# Patient Record
Sex: MALE | Race: WHITE | NOT HISPANIC OR LATINO | URBAN - METROPOLITAN AREA
[De-identification: names, ages, dates, MRNs, and addresses within clinical notes are randomized per-mention and may not be internally consistent; named-entity substitution may affect disease eponyms.]

---

## 2017-04-11 ENCOUNTER — OFFICE VISIT - RIVER FALLS (OUTPATIENT)
Dept: FAMILY MEDICINE | Facility: CLINIC | Age: 57
End: 2017-04-11

## 2017-05-02 ENCOUNTER — OFFICE VISIT - RIVER FALLS (OUTPATIENT)
Dept: FAMILY MEDICINE | Facility: CLINIC | Age: 57
End: 2017-05-02

## 2017-05-02 ASSESSMENT — MIFFLIN-ST. JEOR: SCORE: 2227.4

## 2017-08-18 ENCOUNTER — AMBULATORY - RIVER FALLS (OUTPATIENT)
Dept: FAMILY MEDICINE | Facility: CLINIC | Age: 57
End: 2017-08-18

## 2017-08-19 LAB — HBA1C MFR BLD: 9.4 %

## 2017-08-23 ENCOUNTER — OFFICE VISIT - RIVER FALLS (OUTPATIENT)
Dept: FAMILY MEDICINE | Facility: CLINIC | Age: 57
End: 2017-08-23

## 2017-12-11 ENCOUNTER — AMBULATORY - RIVER FALLS (OUTPATIENT)
Dept: FAMILY MEDICINE | Facility: CLINIC | Age: 57
End: 2017-12-11

## 2017-12-12 LAB
HBA1C MFR BLD: 10.2 %
PSA SERPL-MCNC: 0.2 NG/ML

## 2018-04-23 ENCOUNTER — AMBULATORY - RIVER FALLS (OUTPATIENT)
Dept: FAMILY MEDICINE | Facility: CLINIC | Age: 58
End: 2018-04-23

## 2018-04-24 LAB
CHOLEST SERPL-MCNC: 134 MG/DL
CHOLEST/HDLC SERPL: 3.6 {RATIO}
CREAT SERPL-MCNC: 0.81 MG/DL (ref 0.7–1.33)
GLUCOSE BLD-MCNC: 206 MG/DL (ref 65–99)
HBA1C MFR BLD: 9 %
HDLC SERPL-MCNC: 37 MG/DL
LDLC SERPL CALC-MCNC: 71 MG/DL
NONHDLC SERPL-MCNC: 97 MG/DL
TRIGL SERPL-MCNC: 180 MG/DL

## 2018-06-27 ENCOUNTER — OFFICE VISIT - RIVER FALLS (OUTPATIENT)
Dept: FAMILY MEDICINE | Facility: CLINIC | Age: 58
End: 2018-06-27

## 2018-06-27 ASSESSMENT — MIFFLIN-ST. JEOR: SCORE: 2219.23

## 2018-07-22 LAB — HBA1C MFR BLD: 9.5 %

## 2019-01-04 ENCOUNTER — AMBULATORY - RIVER FALLS (OUTPATIENT)
Dept: FAMILY MEDICINE | Facility: CLINIC | Age: 59
End: 2019-01-04

## 2019-01-05 LAB — HBA1C MFR BLD: 10.1 %

## 2019-01-11 ENCOUNTER — OFFICE VISIT - RIVER FALLS (OUTPATIENT)
Dept: FAMILY MEDICINE | Facility: CLINIC | Age: 59
End: 2019-01-11

## 2019-01-11 ASSESSMENT — MIFFLIN-ST. JEOR: SCORE: 2217.42

## 2019-07-31 ENCOUNTER — COMMUNICATION - RIVER FALLS (OUTPATIENT)
Dept: FAMILY MEDICINE | Facility: CLINIC | Age: 59
End: 2019-07-31

## 2019-10-01 ENCOUNTER — AMBULATORY - RIVER FALLS (OUTPATIENT)
Dept: FAMILY MEDICINE | Facility: CLINIC | Age: 59
End: 2019-10-01

## 2019-10-02 ENCOUNTER — COMMUNICATION - RIVER FALLS (OUTPATIENT)
Dept: FAMILY MEDICINE | Facility: CLINIC | Age: 59
End: 2019-10-02

## 2019-10-02 LAB
BUN SERPL-MCNC: 19 MG/DL (ref 7–25)
BUN/CREAT RATIO - HISTORICAL: ABNORMAL (ref 6–22)
CALCIUM SERPL-MCNC: 9.6 MG/DL (ref 8.6–10.3)
CHLORIDE BLD-SCNC: 99 MMOL/L (ref 98–110)
CHOLEST SERPL-MCNC: 136 MG/DL
CHOLEST/HDLC SERPL: 3.8 {RATIO}
CO2 SERPL-SCNC: 25 MMOL/L (ref 20–32)
CREAT SERPL-MCNC: 0.88 MG/DL (ref 0.7–1.33)
CREAT UR-MCNC: 137 MG/DL (ref 20–320)
EGFRCR SERPLBLD CKD-EPI 2021: 95 ML/MIN/1.73M2
GLUCOSE BLD-MCNC: 283 MG/DL (ref 65–99)
HBA1C MFR BLD: 12 %
HDLC SERPL-MCNC: 36 MG/DL
LDLC SERPL CALC-MCNC: 69 MG/DL
MICROALBUMIN UR-MCNC: 0.7 MG/DL
MICROALBUMIN/CREAT UR: 5 MG/G{CREAT}
NONHDLC SERPL-MCNC: 100 MG/DL
POTASSIUM BLD-SCNC: 4.2 MMOL/L (ref 3.5–5.3)
SODIUM SERPL-SCNC: 133 MMOL/L (ref 135–146)
TRIGL SERPL-MCNC: 223 MG/DL

## 2019-10-08 ENCOUNTER — COMMUNICATION - RIVER FALLS (OUTPATIENT)
Dept: FAMILY MEDICINE | Facility: CLINIC | Age: 59
End: 2019-10-08

## 2019-10-18 ENCOUNTER — OFFICE VISIT - RIVER FALLS (OUTPATIENT)
Dept: FAMILY MEDICINE | Facility: CLINIC | Age: 59
End: 2019-10-18

## 2019-10-18 ASSESSMENT — MIFFLIN-ST. JEOR: SCORE: 2167.52

## 2020-02-24 ENCOUNTER — AMBULATORY - RIVER FALLS (OUTPATIENT)
Dept: FAMILY MEDICINE | Facility: CLINIC | Age: 60
End: 2020-02-24

## 2020-02-25 LAB — HBA1C MFR BLD: 12 %

## 2020-02-26 ENCOUNTER — COMMUNICATION - RIVER FALLS (OUTPATIENT)
Dept: FAMILY MEDICINE | Facility: CLINIC | Age: 60
End: 2020-02-26

## 2020-03-16 ENCOUNTER — COMMUNICATION - RIVER FALLS (OUTPATIENT)
Dept: FAMILY MEDICINE | Facility: CLINIC | Age: 60
End: 2020-03-16

## 2020-06-16 ENCOUNTER — OFFICE VISIT - RIVER FALLS (OUTPATIENT)
Dept: FAMILY MEDICINE | Facility: CLINIC | Age: 60
End: 2020-06-16

## 2020-06-16 ASSESSMENT — MIFFLIN-ST. JEOR: SCORE: 2129.42

## 2020-09-01 ENCOUNTER — COMMUNICATION - RIVER FALLS (OUTPATIENT)
Dept: FAMILY MEDICINE | Facility: CLINIC | Age: 60
End: 2020-09-01

## 2020-10-12 ENCOUNTER — AMBULATORY - RIVER FALLS (OUTPATIENT)
Dept: FAMILY MEDICINE | Facility: CLINIC | Age: 60
End: 2020-10-12

## 2020-10-13 ENCOUNTER — COMMUNICATION - RIVER FALLS (OUTPATIENT)
Dept: FAMILY MEDICINE | Facility: CLINIC | Age: 60
End: 2020-10-13

## 2020-10-13 LAB
BUN SERPL-MCNC: 17 MG/DL (ref 7–25)
BUN/CREAT RATIO - HISTORICAL: ABNORMAL (ref 6–22)
CALCIUM SERPL-MCNC: 9.3 MG/DL (ref 8.6–10.3)
CHLORIDE BLD-SCNC: 103 MMOL/L (ref 98–110)
CHOLEST SERPL-MCNC: 149 MG/DL
CHOLEST/HDLC SERPL: 3.5 {RATIO}
CO2 SERPL-SCNC: 28 MMOL/L (ref 20–32)
CREAT SERPL-MCNC: 0.85 MG/DL (ref 0.7–1.33)
CREAT UR-MCNC: 91 MG/DL (ref 20–320)
EGFRCR SERPLBLD CKD-EPI 2021: 96 ML/MIN/1.73M2
GLUCOSE BLD-MCNC: 209 MG/DL (ref 65–99)
HBA1C MFR BLD: 8.4 %
HDLC SERPL-MCNC: 43 MG/DL
LDLC SERPL CALC-MCNC: 81 MG/DL
MICROALBUMIN UR-MCNC: 0.3 MG/DL
MICROALBUMIN/CREAT UR: 3 MG/G{CREAT}
NONHDLC SERPL-MCNC: 106 MG/DL
POTASSIUM BLD-SCNC: 4.4 MMOL/L (ref 3.5–5.3)
SODIUM SERPL-SCNC: 138 MMOL/L (ref 135–146)
TRIGL SERPL-MCNC: 145 MG/DL

## 2020-10-23 ENCOUNTER — COMMUNICATION - RIVER FALLS (OUTPATIENT)
Dept: FAMILY MEDICINE | Facility: CLINIC | Age: 60
End: 2020-10-23

## 2020-12-23 ENCOUNTER — OFFICE VISIT - RIVER FALLS (OUTPATIENT)
Dept: FAMILY MEDICINE | Facility: CLINIC | Age: 60
End: 2020-12-23

## 2020-12-23 ASSESSMENT — MIFFLIN-ST. JEOR: SCORE: 2217.87

## 2021-02-01 ENCOUNTER — COMMUNICATION - RIVER FALLS (OUTPATIENT)
Dept: FAMILY MEDICINE | Facility: CLINIC | Age: 61
End: 2021-02-01

## 2021-02-05 LAB — HBA1C MFR BLD: 7.1 %

## 2021-06-01 ENCOUNTER — AMBULATORY - RIVER FALLS (OUTPATIENT)
Dept: FAMILY MEDICINE | Facility: CLINIC | Age: 61
End: 2021-06-01

## 2021-06-02 LAB — HBA1C MFR BLD: 7.4 %

## 2021-06-06 ENCOUNTER — COMMUNICATION - RIVER FALLS (OUTPATIENT)
Dept: FAMILY MEDICINE | Facility: CLINIC | Age: 61
End: 2021-06-06

## 2021-06-30 ENCOUNTER — OFFICE VISIT - RIVER FALLS (OUTPATIENT)
Dept: FAMILY MEDICINE | Facility: CLINIC | Age: 61
End: 2021-06-30

## 2021-06-30 ASSESSMENT — MIFFLIN-ST. JEOR: SCORE: 2262.32

## 2021-07-01 ENCOUNTER — COMMUNICATION - RIVER FALLS (OUTPATIENT)
Dept: FAMILY MEDICINE | Facility: CLINIC | Age: 61
End: 2021-07-01

## 2021-07-01 LAB — PSA SERPL-MCNC: 0.2 NG/ML

## 2022-02-11 VITALS — HEIGHT: 69 IN | BODY MASS INDEX: 46.37 KG/M2 | SYSTOLIC BLOOD PRESSURE: 124 MMHG | DIASTOLIC BLOOD PRESSURE: 70 MMHG

## 2022-02-12 VITALS
HEIGHT: 69 IN | HEART RATE: 65 BPM | SYSTOLIC BLOOD PRESSURE: 130 MMHG | DIASTOLIC BLOOD PRESSURE: 66 MMHG | BODY MASS INDEX: 43.4 KG/M2 | OXYGEN SATURATION: 95 % | TEMPERATURE: 97.2 F | WEIGHT: 293 LBS

## 2022-02-12 VITALS
SYSTOLIC BLOOD PRESSURE: 134 MMHG | SYSTOLIC BLOOD PRESSURE: 132 MMHG | BODY MASS INDEX: 46.81 KG/M2 | WEIGHT: 315 LBS | DIASTOLIC BLOOD PRESSURE: 72 MMHG | DIASTOLIC BLOOD PRESSURE: 76 MMHG | HEART RATE: 64 BPM | TEMPERATURE: 98.1 F

## 2022-02-12 VITALS
HEART RATE: 66 BPM | WEIGHT: 314.6 LBS | DIASTOLIC BLOOD PRESSURE: 80 MMHG | HEIGHT: 69 IN | SYSTOLIC BLOOD PRESSURE: 124 MMHG | TEMPERATURE: 97.9 F | DIASTOLIC BLOOD PRESSURE: 76 MMHG | BODY MASS INDEX: 46.6 KG/M2 | SYSTOLIC BLOOD PRESSURE: 128 MMHG

## 2022-02-12 VITALS
HEIGHT: 69 IN | BODY MASS INDEX: 46.33 KG/M2 | SYSTOLIC BLOOD PRESSURE: 124 MMHG | WEIGHT: 312.8 LBS | OXYGEN SATURATION: 98 % | HEART RATE: 58 BPM | DIASTOLIC BLOOD PRESSURE: 68 MMHG

## 2022-02-12 VITALS
WEIGHT: 312.5 LBS | HEIGHT: 69 IN | TEMPERATURE: 97.7 F | HEART RATE: 71 BPM | OXYGEN SATURATION: 97 % | BODY MASS INDEX: 46.29 KG/M2 | DIASTOLIC BLOOD PRESSURE: 72 MMHG | SYSTOLIC BLOOD PRESSURE: 130 MMHG

## 2022-02-12 VITALS
SYSTOLIC BLOOD PRESSURE: 138 MMHG | HEART RATE: 60 BPM | WEIGHT: 312.4 LBS | DIASTOLIC BLOOD PRESSURE: 82 MMHG | BODY MASS INDEX: 46.27 KG/M2 | HEIGHT: 69 IN | SYSTOLIC BLOOD PRESSURE: 122 MMHG | TEMPERATURE: 97.8 F | DIASTOLIC BLOOD PRESSURE: 70 MMHG

## 2022-02-12 VITALS
DIASTOLIC BLOOD PRESSURE: 70 MMHG | SYSTOLIC BLOOD PRESSURE: 124 MMHG | WEIGHT: 301.4 LBS | HEIGHT: 69 IN | SYSTOLIC BLOOD PRESSURE: 128 MMHG | BODY MASS INDEX: 44.64 KG/M2 | HEART RATE: 72 BPM | DIASTOLIC BLOOD PRESSURE: 70 MMHG | TEMPERATURE: 97.8 F

## 2022-02-12 VITALS
HEART RATE: 81 BPM | DIASTOLIC BLOOD PRESSURE: 74 MMHG | TEMPERATURE: 97.4 F | BODY MASS INDEX: 46.65 KG/M2 | SYSTOLIC BLOOD PRESSURE: 128 MMHG | WEIGHT: 315 LBS | OXYGEN SATURATION: 96 % | HEIGHT: 69 IN

## 2022-02-12 VITALS — SYSTOLIC BLOOD PRESSURE: 124 MMHG | HEIGHT: 69 IN | DIASTOLIC BLOOD PRESSURE: 76 MMHG | BODY MASS INDEX: 43.27 KG/M2

## 2022-02-12 VITALS — DIASTOLIC BLOOD PRESSURE: 70 MMHG | HEART RATE: 60 BPM | SYSTOLIC BLOOD PRESSURE: 118 MMHG

## 2022-02-12 VITALS — SYSTOLIC BLOOD PRESSURE: 128 MMHG | DIASTOLIC BLOOD PRESSURE: 70 MMHG | HEART RATE: 70 BPM

## 2022-02-16 NOTE — TELEPHONE ENCOUNTER
"---------------------  From: Brandy Hatfield LPN (Phone Messages Pool ([id number]_[facility]))   To: [facility] Message Pool ([id number]);     Sent: 10/27/2020 9:22:48 AM CDT  Subject: blood sugars/glipizide     Phone Message    PCP:   ABHIJEET      Time of Call:  8:53am       Person Calling:  mercedes Steve  Phone number:  [phone] or [phone]    Returned call at: 9:10am    Note:   Pt LM stating his A1c seems to be climbing a little bit and wants to get ahead of it again. Pt asking for [facility] to call him to discuss.    Per result letter 10/13 A1c was better- [facility] wanted to see him for follow up.    Returned call and informed pt of result letter. Pt says he knows that but his blood sugars are back over 200. Pt says blood sugar Sunday morning was 252, Monday morning 213 and this morning was 232.    Pt says he has been out of glipizide for about 1.5 weeks because \"they\" wouldn't refill them (not on med list). Pt says he thinks he needs them because when he was taking the glipizide his blood sugars were around 146. Pt says the time he did his labs he was still taking the glipizide.    Told pt he should schedule follow up with [facility]. Pt says he has some eye problems and wants to see the eye doctor first but can't until 11/16. Pt also says he needs to have a colonoscopy.    Pt asked that message be sent to [facility] to see about starting glipizide again.    Last office visit and reason:  _Glipizide 10mg 1 tab po bid #180+0, prescribed 4/22/20. This med was completed off med list by Alexandra CAO---------------------  From: Ingrid Calvin LPN ([facility] Message Pool ([id number]))   To: Clare Garcia;     Sent: 10/27/2020 10:09:52 AM CDT  Subject: FW: blood sugars/glipizide---------------------  From: Clare Garcia   To: ABHIJEET Message Pool ([id number]_[facility]);     Sent: 10/27/2020 10:23:56 AM CDT  Subject: RE: blood sugars/glipizide     I sent a message to Alexandra Moser RD to give him a call. SHe and I do not want him back on " the glipizide due to hypoglycemia risk now that he is on insulin, but she might want to adjust his insulinNoted.

## 2022-02-16 NOTE — NURSING NOTE
Comprehensive Intake Entered On:  6/30/2021 8:55 AM CDT    Performed On:  6/30/2021 8:50 AM CDT by Ingrid Calvin LPN               Summary   Chief Complaint :   DM visit, all labs UTD   Weight Measured :   322.3 lb(Converted to: 322 lb 5 oz, 146.193 kg)    Height Measured :   69 in(Converted to: 5 ft 9 in, 175.26 cm)    Body Mass Index :   47.59 kg/m2 (HI)    Body Surface Area :   2.67 m2   Systolic Blood Pressure :   142 mmHg (HI)    Diastolic Blood Pressure :   68 mmHg   Mean Arterial Pressure :   93 mmHg   Peripheral Pulse Rate :   81 bpm   BP Site :   Right arm   BP Method :   Manual   Temperature Tympanic :   97.4 DegF(Converted to: 36.3 DegC)  (LOW)    Oxygen Saturation :   96 %   Ingrid Calvin LPN - 6/30/2021 8:50 AM CDT   Health Status   Allergies Verified? :   Yes   Medication History Verified? :   Yes   Medical History Verified? :   No   Pre-Visit Planning Status :   Completed   Tobacco Use? :   Never smoker   Ingrid Calvin LPN - 6/30/2021 8:50 AM CDT   Meds / Allergies   (As Of: 6/30/2021 8:55:28 AM CDT)   Allergies (Active)   Actos  Estimated Onset Date:   Unspecified ; Reactions:   rash ; Created By:   Clare Garcia; Reaction Status:   Active ; Category:   Drug ; Substance:   Actos ; Type:   Allergy ; Updated By:   Clare Garcia; Reviewed Date:   6/17/2020 4:00 PM CDT      Farxiga  Estimated Onset Date:   Unspecified ; Reactions:   rash ; Created By:   Myra Sigala; Reaction Status:   Active ; Category:   Drug ; Substance:   Farxiga ; Type:   Allergy ; Updated By:   Myra Sigala; Reviewed Date:   6/17/2020 4:00 PM CDT      metFORMIN  Estimated Onset Date:   Unspecified ; Reactions:   diarrhea ; Created By:   Vannesa Mcgowan MA; Reaction Status:   Active ; Category:   Drug ; Substance:   metFORMIN ; Type:   Intolerance ; Updated By:   Vannesa Mcgowan MA; Source:   Patient ; Reviewed Date:   6/17/2020 4:00 PM CDT        Medication List   (As Of: 6/30/2021 8:55:28 AM CDT)    Prescription/Discharge Order    Miscellaneous Rx Supply  :   Miscellaneous Rx Supply ; Status:   Prescribed ; Ordered As Mnemonic:   True Test BGM Strips MG BEAD ; Simple Display Line:   See Instructions, Test 3-4 times daily, 1 box(es) ; Ordering Provider:   Adrián Bass MD; Catalog Code:   Miscellaneous Rx Supply ; Order Dt/Tm:   4/15/2015 9:05:01 AM CDT          Miscellaneous Prescription  :   Miscellaneous Prescription ; Status:   Prescribed ; Ordered As Mnemonic:   TRUETEST BGM STRIPS MG BEAD ; Simple Display Line:   See Instructions, TEST THREE TIMES A DAY TO FOUR TIMES A DAY, 50 unknown unit, 5 Refill(s) ; Ordering Provider:   Adrián Bass MD; Catalog Code:   Miscellaneous Prescription ; Order Dt/Tm:   2/1/2018 2:22:55 PM CST          glipiZIDE  :   glipiZIDE ; Status:   Prescribed ; Ordered As Mnemonic:   glipiZIDE 10 mg oral tablet ; Simple Display Line:   See Instructions, TAKE ONE TABLET BY MOUTH TWICE DAILY, 180 tab(s), 2 Refill(s) ; Ordering Provider:   Clare Garcia; Catalog Code:   glipiZIDE ; Order Dt/Tm:   11/2/2020 5:11:12 PM CST          simvastatin  :   simvastatin ; Status:   Prescribed ; Ordered As Mnemonic:   simvastatin 40 mg oral tablet ; Simple Display Line:   See Instructions, TAKE ONE TABLET BY MOUTH AT BEDTIME, 90 EA, 3 Refill(s) ; Ordering Provider:   Clare Garcia; Catalog Code:   simvastatin ; Order Dt/Tm:   12/23/2020 9:54:33 AM CST          insulin aspart  :   insulin aspart ; Status:   Prescribed ; Ordered As Mnemonic:   NovoLOG FlexPen 100 units/mL injectable solution ; Simple Display Line:   See Instructions, take insulin 15 minutes before meals   5u am, 7u noon, 7u evening meal increasing as directed, 15 mL, 3 Refill(s) ; Ordering Provider:   Clare Garcia; Catalog Code:   insulin aspart ; Order Dt/Tm:   6/16/2020 11:22:14 AM CDT          insulin glargine  :   insulin glargine ; Status:   Prescribed ; Ordered As Mnemonic:   Lantus Solostar Pen 100  units/mL subcutaneous solution ; Simple Display Line:   See Instructions, starting at 20u and increasing up to 40u as needed max dose   every evening, 15 mL, 3 Refill(s) ; Ordering Provider:   Clare Garcia; Catalog Code:   insulin glargine ; Order Dt/Tm:   6/16/2020 11:11:19 AM CDT          Miscellaneous Prescription  :   Miscellaneous Prescription ; Status:   Prescribed ; Ordered As Mnemonic:   pen needles 31G 6mm ; Simple Display Line:   See Instructions, use new needle daily with Soliqua, 1 box(es), 1 Refill(s) ; Ordering Provider:   Clare Garcia; Catalog Code:   Miscellaneous Prescription ; Order Dt/Tm:   12/17/2019 2:18:08 PM CST          albuterol  :   albuterol ; Status:   Prescribed ; Ordered As Mnemonic:   Ventolin HFA 90 mcg/inh inhalation aerosol ; Simple Display Line:   2 puff(s), Inhale, qid, 1 EA, 1 Refill(s) ; Ordering Provider:   Clare Garcia; Catalog Code:   albuterol ; Order Dt/Tm:   9/1/2020 11:14:01 AM CDT          metFORMIN  :   metFORMIN ; Status:   Prescribed ; Ordered As Mnemonic:   MetFORMIN (Eqv-Glucophage XR) 500 mg oral tablet, extended release ; Simple Display Line:   2 tab(s), Oral, bid, 120 EA, 0 Refill(s) ; Ordering Provider:   Clare Garcia; Catalog Code:   metFORMIN ; Order Dt/Tm:   6/29/2021 3:33:53 PM CDT            Home Meds    aspirin  :   aspirin ; Status:   Documented ; Ordered As Mnemonic:   aspirin 325 mg oral tablet ; Simple Display Line:   325 mg, 1 tab(s), po, daily, 0 Refill(s) ; Catalog Code:   aspirin ; Order Dt/Tm:   5/3/2017 5:04:05 PM CDT

## 2022-02-16 NOTE — LETTER
(Inserted Image. Unable to display)   July 22, 2021  TAWANA GILES  [address] [place] [id number]        Dear TAWANA,    Thank you for selecting [facility] for your healthcare needs.    Our records indicate you are due for the following services:     Medication Check      (FYI   Regarding office visits: In some instances, a video visit or telephone visit may be offered as an option.)    To schedule an appointment or if you have further questions, please contact your clinic at [phone].    Powered by "[employer]    Sincerely,     TING Gardner

## 2022-02-16 NOTE — TELEPHONE ENCOUNTER
Entered by Anna Avitia CMA on January 02, 2019 10:48:51 AM CST  ---------------------  From: Anna Avitia CMA   To: [facility]    Sent: 1/2/2019 10:48:51 AM CST  Subject: Medication Management     ** Submitted: **  Order:glipiZIDE (glipiZIDE 10 mg oral tablet)  1 tab(s)  Oral  bid  Qty:  60 tab(s)        Refills:  0          Substitutions Allowed     Route To Pharmacy - [facility]    Signed by Anna Avitia CMA  1/2/2019 10:48:00 AM    ** Submitted: **  Complete:glipiZIDE (glipiZIDE 10 mg oral tablet)   Signed by Anna Avitia CMA  1/2/2019 10:48:00 AM    ** Not Approved:  **  glipiZIDE (GLIPIZIDE 10MG TABLET)  TAKE ONE TABLET BY MOUTH TWICE DAILY  Qty:  180 unknown unit        Days Supply:  0        Refills:  0          KENDRA     Route To [facility]   Signed by Anna Avitia CMA            ** Patient matched by Anna Avitia CMA on 1/2/2019 10:47:26 AM CST **      ------------------------------------------  From: [facility]  To: Clare Garcia  Sent: January 2, 2019 8:56:00 AM CST  Subject: Medication Management  Due: January 3, 2019 8:56:00 AM CST    ** On Hold Pending Signature **  Drug: glipiZIDE (glipiZIDE 10 mg oral tablet)  TAKE ONE TABLET BY MOUTH TWICE DAILY  Quantity: 180 unknown unit Days Supply: 0         Refills: 0  Substitutions Allowed  Notes from Pharmacy:     Dispensed Drug: glipiZIDE (glipiZIDE 10 mg oral tablet)  TAKE ONE TABLET BY MOUTH TWICE DAILY  Quantity: 180 unknown unit Days Supply: 0         Refills: 0  Substitutions Allowed  Notes from Pharmacy:   ------------------------------------------Med Refill      Date of last office visit and reason:  6/27/18      Date of last Med Check / Px:   6/27/18  Date of last labs pertaining to med:  7/19/18    RTC order in chart:  yes    For Protocol refill, has patient been contacted:  n/a - appt scheduled

## 2022-02-16 NOTE — TELEPHONE ENCOUNTER
---------------------From: Yocasta Carroll To:  <[email or web address]>;   Sent: 10/22/2019 8:04:34 AM CDTSubject: General Message Patient: TAWANA GILES; YOB: 1960 The attached Referral Note is for an appointment to be scheduled.  Referral faxed to [facility], they will contact patient to schedule.

## 2022-02-16 NOTE — CARE COORDINATION
Pt appears on [facility] chronic disease panel as out of parameters for A1C.  Pt has RTC due to go out in couple weeks, wait for pt rsp.

## 2022-02-16 NOTE — TELEPHONE ENCOUNTER
Entered by Shelby Schneider CMA on October 08, 2019 3:58:04 PM CDT  ---------------------  From: Shelby Schneider CMA   To: [facility]    Sent: 10/8/2019 3:58:04 PM CDT  Subject: Medication Management     ** Submitted: **  Order:metFORMIN (metFORMIN 500 mg oral tablet, extended release)  2 tab(s)  Oral  bid  Qty:  56 tab(s)        Duration:  14 day(s)        Refills:  0          Substitutions Allowed     Route To Pharmacy - [facility]    Signed by Shelby Schneider CMA  10/8/2019 3:57:00 PM    ** Submitted: **  Complete:metFORMIN (metFORMIN 500 mg oral tablet, extended release)   Signed by Shelby Schneider CMA  10/8/2019 3:58:00 PM    ** Not Approved:  **  metFORMIN (METFORMIN    TAB 500MG ER TABLET)  TAKE 2 TABLETS BY MOUTH TWICE DAILY - DOSE CHANGE 01/11/2019 - NEEDS APPT  Qty:  120 unknown unit        Days Supply:  0        Refills:  0          Substitutions Allowed     Route To Pharmacy - [facility]   Signed by Shelby Schneider CMA          Due for a visit  completed labs 10/1-no visit scheduled with [facility]  last seen 1/2019    LM for pt to return call at 1556 to help set up appt  sending 2wks to pharmacy with note due for appt      ** Patient matched by Shelby Schneider CMA on 10/8/2019 3:55:14 PM CDT **      ------------------------------------------  From: [facility]  To: Clare Garcia  Sent: October 8, 2019 2:18:31 PM CDT  Subject: Medication Management  Due: October 9, 2019 2:18:31 PM CDT    ** On Hold Pending Signature **  Drug: metFORMIN (metFORMIN 500 mg oral tablet, extended release)  TAKE 2 TABLETS BY MOUTH TWICE DAILY - DOSE CHANGE 01/11/2019 - NEEDS APPT  Quantity: 120 unknown unit  Days Supply: 0  Refills: 0  Substitutions Allowed  Notes from Pharmacy:     Dispensed Drug: metFORMIN (metFORMIN 500 mg oral tablet, extended release)  TAKE 2 TABLETS BY MOUTH TWICE DAILY - DOSE CHANGE 01/11/2019 - NEEDS APPT  Quantity: 120 unknown unit  Days Supply: 0  Refills: 0  Substitutions Allowed  Notes from Pharmacy:    ------------------------------------------

## 2022-02-16 NOTE — TELEPHONE ENCOUNTER
---------------------  From: Clare Garcia   To: Marcella Moser;     Sent: 10/24/2019 8:16:44 PM CDT  Subject: General Message     Alexandra,  I spoke with Steve rivera on the phone about using Soliqua with the savings card. He is very willing to do this and tells me that you have instructed him in the past on insulin and he had good luck with that and that he is comfortable using the device and giving injections. He ask that I leave the prescription and savings card and information at the  for him to  and he will call you if he has any questions. Thanks so much helping to make this happen---------------------  From: Marcella Moser   To: Clare Garcia;     Sent: 10/28/2019 1:49:32 PM CDT  Subject: RE: General Message     Perfect, have him start at 15u and gradually increase every couple days from there.---------------------  From: Marcella Moser   To: Clare Garcia;     Sent: 10/28/2019 1:50:04 PM CDT  Subject: RE: General Message     one more thing.     Take it within one hour prior to the first meal of the day.---------------------  From: Clare Garcia   To: Marcella Moser;     Sent: 10/28/2019 2:03:02 PM CDT  Subject: RE: General Message     thanks.

## 2022-02-16 NOTE — TELEPHONE ENCOUNTER
Entered by Martine Rodríguez on March 16, 2020 5:42:37 PM CDT  ---------------------  From: Martine Rodríguez   To: [facility]    Sent: 3/16/2020 5:42:37 PM CDT  Subject: Medication Management     ** Submitted: **  Order:simvastatin (simvastatin 40 mg oral tablet)  See Instructions  TAKE ONE TABLET BY MOUTH AT BEDTIME  Qty:  90 unknown unit        Days Supply:  0        Refills:  0          Substitutions Allowed     Route To Pharmacy - [facility]    Signed by Martine Rodríguez  3/16/2020 5:42:00 PM    ** Submitted: **  Complete:simvastatin (simvastatin 40 mg oral tablet)   Signed by Martine Rodríguez  3/16/2020 5:42:00 PM    ** Not Approved:  **  simvastatin (SIMVASTATIN 40MG TABLET)  TAKE ONE TABLET BY MOUTH AT BEDTIME  Qty:  90 unknown unit        Days Supply:  0        Refills:  0          Substitutions Allowed     Route To Pharmacy - [facility]   Signed by Martine Rodríguez            ** Submitted: **  Order:metFORMIN (metFORMIN 500 mg oral tablet, extended release)  See Instructions  TAKE 2 TABLETS BY MOUTH TWICE DAILY  Qty:  360 unknown unit        Days Supply:  0        Refills:  0          Substitutions Allowed     Route To Pharmacy - [facility]    Signed by Martine Rodríguez  3/16/2020 5:41:00 PM    ** Submitted: **  Complete:metFORMIN (metFORMIN 500 mg oral tablet, extended release)   Signed by Martine Rodríguez  3/16/2020 5:42:00 PM    ** Not Approved:  **  metFORMIN (METFORMIN    TAB 500MG ER TABLET)  TAKE 2 TABLETS BY MOUTH TWICE DAILY  Qty:  360 unknown unit        Days Supply:  0        Refills:  0          Substitutions Allowed     Route To Pharmacy - [facility]   Signed by Martine Rodríguez            ** Submitted: **  Order:glipiZIDE (glipiZIDE 10 mg oral tablet)  See Instructions  TAKE ONE TABLET BY MOUTH TWICE DAILY  Qty:  180 unknown unit        Days Supply:  0        Refills:  0          Substitutions Allowed      Route To [facility]    Signed by Martine Rodríguez  3/16/2020 5:41:00 PM    ** Submitted: **  Complete:glipiZIDE (glipiZIDE 10 mg oral tablet)   Signed by Martine Rodríguez  3/16/2020 5:41:00 PM    ** Not Approved:  **  glipiZIDE (GLIPIZIDE    TAB 10MG TABLET)  TAKE ONE TABLET BY MOUTH TWICE DAILY  Qty:  180 unknown unit        Days Supply:  0        Refills:  0          Substitutions Allowed     Route To [facility]   Signed by Martine Rodríguez            ** Patient matched by Martine Rodríguez on 3/16/2020 5:37:42 PM CDT **      ------------------------------------------  From: [facility]  To: Clare Garcia  Sent: March 16, 2020 4:19:29 PM CDT  Subject: Medication Management  Due: March 17, 2020 4:19:29 PM CDT    ** On Hold Pending Signature **  Drug: simvastatin (Zocor 40 mg oral tablet)  TAKE ONE TABLET BY MOUTH AT BEDTIME  Quantity: 90 unknown unit  Days Supply: 0  Refills: 0  Substitutions Allowed  Notes from Pharmacy:     Dispensed Drug: simvastatin (simvastatin 40 mg oral tablet)  TAKE ONE TABLET BY MOUTH AT BEDTIME  Quantity: 90 unknown unit  Days Supply: 0  Refills: 0  Substitutions Allowed  Notes from Pharmacy:     ** On Hold Pending Signature **  Drug: glipiZIDE (glipiZIDE 10 mg oral tablet)  TAKE ONE TABLET BY MOUTH TWICE DAILY  Quantity: 180 unknown unit  Days Supply: 0  Refills: 0  Substitutions Allowed  Notes from Pharmacy:     Dispensed Drug: glipiZIDE (glipiZIDE 10 mg oral tablet)  TAKE ONE TABLET BY MOUTH TWICE DAILY  Quantity: 180 unknown unit  Days Supply: 0  Refills: 0  Substitutions Allowed  Notes from Pharmacy:     ** On Hold Pending Signature **  Drug: metFORMIN (metFORMIN 500 mg oral tablet, extended release)  TAKE 2 TABLETS BY MOUTH TWICE DAILY  Quantity: 360 unknown unit  Days Supply: 0  Refills: 0  Substitutions Allowed  Notes from Pharmacy:     Dispensed Drug: metFORMIN (metFORMIN 500 mg oral tablet, extended release)   TAKE 2 TABLETS BY MOUTH TWICE DAILY  Quantity: 360 unknown unit  Days Supply: 0  Refills: 0  Substitutions Allowed  Notes from Pharmacy:   ------------------------------------------Med Refill      Date of last office visit and reason:  10/18/2019; DM/ shoulder pain                                   Date of last Med Check / Px:   10/18/2019  Date of last labs pertaining to med:  02/24/2020; A1C           10/18/2019    RTC order in chart:  yes, due April 2020    For Protocol refill, has patient been contacted:  No, sent 3 month supply due for COVID-19. Note sent to pharmacy that pt will need appt for further refills.

## 2022-02-16 NOTE — TELEPHONE ENCOUNTER
---------------------  From: Ingrid Calvin LPN (Phone Messages Pool ([id number]_[place] [facility]))   To: Appointment Pool ([id number]_[place]);     Sent: 2/1/2021 10:30:20 AM CST  Subject: Lab Appt       Phone Message    PCP: ABHIJEET    Time of call: [phone]    Person calling: Ermias  Contact # : [phone]    MESSAGE: Pt calls stating he needs to schedule a non-fasting lab appt to check his A1C. Pt would like to do this Thursday if possible.    Last visit/reason: 12/23/20 - preop/DM    **Please contact patient for lab only visit, non-fasting. He should also schedule a diabetic appt with [facility] a few days later.called pt unable to reach patient X1left message for patient to call back and schedule X2

## 2022-02-16 NOTE — LETTER
(Inserted Image. Unable to display)         January 21, 2021        TAWANA GILES  [address] [place] [phone]        Dear TAWANA,    Thank you for selecting [facility] for your healthcare needs.    Our records indicate you are due for the following services:     Diabetic Exam ~ Please bring your glucose meter and/or your blood glucose diary to your appointment.    Non-Fasting Labs    If you had your labs done at another facility or with Direct Access Lab Testing at [facility], please bring in a copy of the results to your next visit, mail a copy, or drop off a copy of your results to your Healthcare Provider.     (FYI   Regarding office visits: In some instances, a video visit or telephone visit may be offered as an option.)    To schedule an appointment or if you have further questions, please contact your clinic at [phone].    Powered by [employer]    Sincerely,     TING Gardner

## 2022-02-16 NOTE — NURSING NOTE
Quick Intake Entered On:  10/1/2019 9:07 AM CDT    Performed On:  10/1/2019 9:07 AM CDT by Daisy Sotelo RN               Summary   Systolic Blood Pressure :   128 mmHg   Diastolic Blood Pressure :   70 mmHg   Mean Arterial Pressure :   89 mmHg   Daisy Sotelo RN - 10/1/2019 9:07 AM CDT

## 2022-02-16 NOTE — TELEPHONE ENCOUNTER
---------------------  From: Clare Garcia   To: Marcella Moser;     Sent: 2/26/2020 11:38:39 AM CST  Subject: General Message     Alexandra, I sent him a message to see you, thinking that insulin might need to be the next step. I believe he has had med reactions to actos, farxiga and metfomin and other things just haven't been affordable. Hopefully he will schedule with you. If so, I defer to your decision regarding whatever agent you think would be most beneficial/affordable. Thanks    Clare---------------------  From: Marcella Moser   To: Clare Garcia;     Sent: 2/26/2020 12:04:28 PM CST  Subject: RE: General Message     Sounds good, I will keep an eye out for his appointment.  [facility] insulin may likely be his best option.    Thank you!  Alexandra

## 2022-02-16 NOTE — LETTER
(Inserted Image. Unable to display)   October 13, 2020      TAWANA GILES      [address] [place] [phone]        Dear TAWANA,    Thank you for selecting [facility] for your healthcare needs.  Below you will find the results of the recent tests done at our clinic.      Wow, Steve! You're A1C is SO much better! Great work! I should see you in follow up.      Result Name Current Result Previous Result Reference Range   Sodium Level (mmol/L)  138 10/12/2020 ((L)) 133 10/1/2019 135 - 146   Potassium Level (mmol/L)  4.4 10/12/2020  4.2 10/1/2019 3.5 - 5.3   Chloride Level (mmol/L)  103 10/12/2020  99 10/1/2019 98 - 110   CO2 Level (mmol/L)  28 10/12/2020  25 10/1/2019 20 - 32   Glucose Level (mg/dL) ((H)) 209 10/12/2020 ((H)) 283 10/1/2019 65 - 99   BUN (mg/dL)  17 10/12/2020  19 10/1/2019 7 - 25   Creatinine Level (mg/dL)  0.85 10/12/2020  0.88 10/1/2019 0.70 - 1.33   BUN/Creat Ratio  NOT APPLICABLE 10/12/2020  NOT APPLICABLE 10/1/2019 6 - 22   eGFR (mL/min/1.73m2)  96 10/12/2020  95 10/1/2019 > OR = 60 -    eGFR  (mL/min/1.73m2)  111 10/12/2020  111 10/1/2019 > OR = 60 -    Calcium Level (mg/dL)  9.3 10/12/2020  9.6 10/1/2019 8.6 - 10.3   Hgb A1c ((H)) 8.4 10/12/2020 ((H)) 12.0 2/24/2020  - <5.7   Cholesterol (mg/dL)  149 10/12/2020  136 10/1/2019  - <200   Non-HDL Cholesterol  106 10/12/2020  100 10/1/2019  - <130   HDL (mg/dL)  43 10/12/2020 ((L)) 36 10/1/2019 > OR = 40 -    Cholesterol/HDL Ratio  3.5 10/12/2020  3.8 10/1/2019  - <5.0   LDL  81 10/12/2020  69 10/1/2019    Triglyceride (mg/dL)  145 10/12/2020 ((H)) 223 10/1/2019  - <150   U Creatinine (mg/dL)  91 10/12/2020  20 - 320   U Microalbumin (mg/dL)  0.3 10/12/2020  0.7 10/1/2019 See Note: -    Ur Microalbumin/Creatinine Ratio  3 10/12/2020  5 10/1/2019  - <30       Please contact me or my assistant at [phone] if you have any questions or concerns.     Sincerely,        TING Gardner  Family  Nurse Practitioner      What do your labs mean?  Below is a glossary of commonly ordered labs:  LDL   Bad Cholesterol   HDL   Good Cholesterol  AST/ALT   Liver Function   Cr/Creatinine   Kidney Function  Microalbumin   Kidney Function  BUN   Kidney Function  PSA   Prostate    TSH   Thyroid Hormone  HgbA1c   Diabetes Test   Hgb (Hemoglobin)   Red Blood Cells  WBC   White Blood Cell Count

## 2022-02-16 NOTE — PROGRESS NOTES
Chief Complaint    Pt here for DM check  History of Present Illness      1/11/2019      the patient presents for follow-up evaluation of diabetes, Steve continues to struggle with DM      he has had reactions to Actos and Farxiga and will not take these      he believes he tolerated the the extended release metformin but the pharmacy was not giving it to him, so he owuldlike me to send rx for extended release and he will try and take the max dose per day.      He is willing to try another medication but cost is a large concern      He has a high deductible             He has not gained wt but struggles to follow DM diet--has toast and peanut butter in morning, trieds for a variety of foods      Has many family stressors at home with his 3 grown kids      Farms full timej--chops wood/cleans hiren and is very physically active without CP or SOB      non smoker      is overdue for an eye exam, I emphasized the need for this      Denies numbness in his feet.         10/18/2019      Back for DM recheck, weight is down a bit but admits to sweets in house      states care coord call him but he was so busy didn't get back to them.      He has a high deductible and has not been successful with DM meds, A1 C continues to climb      Also c/o right shoulder pain today, last winter in Feb he slipped on ice and hit his right shoulder, he still has some numbness in the hand and weakness and pain in the shoulder, tho he has good ROM. he would like 'a shot' in the shoulder          Review of Systems      no SOB or CP or swelling  Physical Exam   Vitals & Measurements    T: 97.8   F (Tympanic)  HR: 72(Peripheral)  BP: 124/70     HT: 69 in  WT: 301.4 lb  BMI: 44.5         General:  Alert and oriented, No acute distress.          Eye:  Normal conjunctiva.          HENT:  Normocephalic, Tympanic membranes are clear, Oral mucosa is moist, No pharyngeal erythema.          Neck:  Supple, Non-tender, No lymphadenopathy.          Respiratory:   Lungs are clear to auscultation, Respirations are non-labored, Breath sounds are equal.          Cardiovascular:  Normal rate, Regular rhythm, No murmur, Good pulses equal in all extremities, Normal peripheral perfusion.          Gastrointestinal:  Soft, Non-tender, Non-distended, No organomegaly.          Musculoskeletal:  Normal range of motion, Normal gait.  He has full ROM of the right shoulder but is point tener of the AC joint. Mildly strong hand , strong radial pulse        Integumentary:  Warm, Pink, Moist, No rash.          Neurologic:  Alert, Oriented,        Psychiatric:  Cooperative.    Assessment/Plan       Diabetes mellitus, type II (E11.9)           Course:  Worsening, A1C is climbing          will consult care coordinators again , hopefully we can connect him with a patient assist program for a different DM med          counseled on avoiding carbs/breads/rice/pasta          advised further wt loss          Pt may need to add NP insulin if drug assistance program does not come together                Obesity (E66.01)                Shoulder pain, right (M25.511)         Ordered:          Referral (Request), 10/18/19 10:39:00 CDT, Referred to: Orthopaedics, Shoulder pain, right                Orders:         glipiZIDE, = 1 tab(s), Oral, bid, # 180 tab(s), 1 Refill(s), Type: Maintenance, Pharmacy: [facility], 1 tab(s) Oral bid, (Ordered)         metFORMIN, = 2 tab(s), Oral, bid, # 360 tab(s), 1 Refill(s), Type: Maintenance, Pharmacy: [facility], 2 tab(s) Oral bid, (Ordered)         simvastatin, = 1 tab(s) ( 40 mg ), po, hs, # 90 tab(s), 3 Refill(s), Type: Maintenance, Pharmacy: [facility], 1 tab(s) Oral hs, (Ordered)  Patient Information     Name:TAWANA GILES      Address:      [address] [place] [phone]     Sex:Male     YOB: 1960     [phone]     Emergency Contact:[facility], CONTACT     MRN:[id number]     FIN:[id number]     Location:[facility]     Date of Service:10/18/2019      Primary Care Physician:       Clare Garcia, [phone]      Attending Physician:       Clare Garcia, [phone]  Problem List/Past Medical History    Ongoing     Diabetes mellitus, type II     Family History of Colon Cancer     Long term current use of oral hypoglycemic drug     Obesity     Tubular adenoma of colon    Historical     No qualifying data  Procedure/Surgical History     Colonoscopy (04/22/2015)            Comments: Indication: Family history      Sedation: MAC      Findings: Tubular adenoma Proximal ascending      Recommendation: Colonoscopy in 5 years. divertculosis extending to the transverse.     Colonoscopy (01.2009)            Comments: Recheck 2014..     Repair of ventral hernia (Week of 10/11/2002)           Repair of ventral hernia (1988)           back surgery (1980)            Comments: ?1980's.     Vasectomy        Medications    aspirin 325 mg oral tablet, 325 mg= 1 tab(s), Oral, daily    glipiZIDE 10 mg oral tablet, 1 tab(s), Oral, bid, 1 refills    metFORMIN 500 mg oral tablet, extended release, 2 tab(s), Oral, bid, 1 refills    simvastatin 40 mg oral tablet, 40 mg= 1 tab(s), Oral, hs, 3 refills    True Test BGM Strips MG BEAD, See Instructions, 5 refills    TRUETEST BGM STRIPS MG BEAD, See Instructions, 5 refills  Allergies    Actos (rash)    Farxiga (rash)    metFORMIN (diarrhea)  Social History    Smoking Status - 10/18/2019     Never smoker     Alcohol - Current, 11/08/2011     Employment/School     Home/Environment      Marital status: ., 11/08/2011     Substance Abuse - Denies Substance Abuse, 11/08/2011     Tobacco - Denies Tobacco Use, 03/24/2010  Family History    Accident: Sister.    CA - Cancer: Father.    CA - Cancer of colon: Father.    CA - Lung cancer: Mother.  Immunizations      Vaccine Date Status      ZOS, shingles 04/11/2017 Recorded      tetanus/diphth/pertuss (Tdap) adult/adol  11/10/2011 Given      influenza virus vaccine, inactivated 11/10/2011 Given  Lab Results       Lab Results (Last 4 results within 90 days)        Sodium Level: 133 mmol/L Low [135 mmol/L - 146 mmol/L] (10/01/19 08:50:00)       Potassium Level: 4.2 mmol/L [3.5 mmol/L - 5.3 mmol/L] (10/01/19 08:50:00)       Chloride Level: 99 mmol/L [98 mmol/L - 110 mmol/L] (10/01/19 08:50:00)       CO2 Level: 25 mmol/L [20 mmol/L - 32 mmol/L] (10/01/19 08:50:00)       Glucose Level: 283 mg/dL High [65 mg/dL - 99 mg/dL] (10/01/19 08:50:00)       BUN: 19 mg/dL [7 mg/dL - 25 mg/dL] (10/01/19 08:50:00)       Creatinine Level: 0.88 mg/dL [0.7 mg/dL - 1.33 mg/dL] (10/01/19 08:50:00)       BUN/Creat Ratio: NOT APPLICABLE [6  - 22] (10/01/19 08:50:00)       eGFR: 95 mL/min/1.73m2 (10/01/19 08:50:00)       eGFR : 111 mL/min/1.73m2 (10/01/19 08:50:00)       Calcium Level: 9.6 mg/dL [8.6 mg/dL - 10.3 mg/dL] (10/01/19 08:50:00)       Hgb A1c: 12 High (10/01/19 08:50:00)       Cholesterol: 136 mg/dL (10/01/19 08:50:00)       Non-HDL Cholesterol: 100 (10/01/19 08:50:00)       HDL: 36 mg/dL Low (10/01/19 08:50:00)       Cholesterol/HDL Ratio: 3.8 (10/01/19 08:50:00)       LDL: 69 (10/01/19 08:50:00)       Triglyceride: 223 mg/dL High (10/01/19 08:50:00)       U Microalbumin: 0.7 mg/dL (10/01/19 08:50:00)       Ur Creatinine: 137 mg/dL [20 mg/dL - 320 mg/dL] (10/01/19 08:50:00)       Ur Microalbumin/Creatinine Ratio: 5 (10/01/19 08:50:00)

## 2022-02-16 NOTE — TELEPHONE ENCOUNTER
---------------------  From: Vannesa Mcgoawn MA (Phone Messages Pool ([id number]))   To: Phone Messages Pool ([address] [place] [address]);     Sent: 10/26/2020 3:16:34 PM CDT  Subject: Med concerns     Phone Message    PCP:   [facility]      Time of Call:  1455       Person Calling:  pt  Phone number:  [phone]    Reason for call:  pt was wanting to speak w/ [facility] re: rx refill  Returned call at: _    Note:   LMTCB at 1515    Last office visit and reason:  _    Transferred to: _---------------------  From: Vannesa Mcgowan MA (Phone Messages Pool ([id number]))   To: [facility] Message Pool ([id number]);     Sent: 10/26/2020 5:45:14 PM CDT  Subject: FW: Med concerns     Pt called/LM at 1744 wanting to speak w/ [facility] re: rx for glipizide.  Call either [phone] or [phone].---------------------  From: Ingrid Calvin LPN ([facility] Message Pool ([address] [place] [address]))   To: Clare Garcia;     Sent: 10/27/2020 9:05:43 AM CDT  Subject: FW: Med concerns---------------------  From: Clare Garcia   To: Marcella Moser;     Sent: 10/27/2020 9:48:00 AM CDT  Subject: FW: Med concerns     ANGELIQUE Morris,  Would you have time to call Ermias today. I think he believes he should be back on his glipizide but I believe you and I agreed that would not be a  good plan (risk of hypogylcemia) being that he is now on insulin.  Maybe you could offer him other remedies for his rising blood sugars? Thanks so much.---------------------  From: Marcella Moser   To: Clare Garcia;     Sent: 10/29/2020 2:58:58 PM CDT  Subject: RE: Med concerns     Have had to leave messages.---------------------  From: Clare Garcia   To: Marcella Moser;     Sent: 10/29/2020 4:01:21 PM CDT  Subject: RE: Med concerns     noted, thanks---------------------  From: Marcella Moser   To: Claer Garcia;     Sent: 11/2/2020 5:14:39 PM CST  Subject: RE: Med concerns     Called and talked with pt.  Pt states he would like to stay on  the glipizide.  Discussed potential for hypoglycemia and pt agrees to closely monitor BG.  Pt states he never stopped taking the glipizide with MDI insulin and it worked better for him.  Now his rx ran out and I did refill it.---------------------  From: Clare Garcia   To: Marcella Moser;     Sent: 11/2/2020 5:23:56 PM CST  Subject: RE: Med concerns     Thanks so much

## 2022-02-16 NOTE — LETTER
(Inserted Image. Unable to display)   May 10, 2021    TAWANA GILES  [address] [place] [id number]            Dear TAWANA,      Thank you for selecting [facility] for your healthcare needs.    Our records indicate you are due for the following services:    Diabetic Exam ~ Please bring your glucose meter and/or your blood glucose diary to your appointment.    Non-Fasting Labs.    If you had your labs done at another facility or with Direct Access Lab Testing at [facility], please bring in a copy of the results to your next visit, mail a copy, or drop off a copy of your results to your Healthcare Provider.    (FYI   Regarding office visits: In some instances, a video visit or telephone visit may be offered as an option.)    You are due for lab work and an office visit; please schedule the lab appointment 1 week before the office visit.  This will assure all results are available to discuss with your Healthcare Provider during your visit.    **It is very helpful if you bring your medication bottles to your appointment.  This assures we have all of your current medications, including strength and dosing information, documented accurately in your medical record.    To schedule an appointment or if you have further questions, please contact your clinic at [phone].      Powered by [employer]    Sincerely,    TING Fontanez

## 2022-02-16 NOTE — TELEPHONE ENCOUNTER
"---------------------  From: Arabella Douglas (eRx Pool ([id number]_[facility]))   To: Clare Garcia;     Sent: 9/1/2020 11:05:15 AM CDT  Subject: FW: Medication Management   Due Date/Time: 9/1/2020 4:46:00 PM CDT     Med Refill      Date of last office visit and reason:  10/18/19 for DM/shoulder with [facility]      Date of last Med Check / Px:   10/18/19  Date of last labs pertaining to med:  n/a    Albuterol was d/c'd off of med list, last filled in 2017.  No dx of Asthma, COPD or other pulmonary disorders.     I spoke with patient at 11:02am to discuss.  He says he needs inhaler due to allergies.  They are \"kicking his butt\" this year.  Denies COVID-19 symptoms. Please advise on refills of discontinued med.         ** Patient matched by Arabella Douglas on 9/1/2020 10:59:07 AM CDT **        ------------------------------------------  From: [employer]  To: Clare Garcia  Sent: August 31, 2020 4:46:03 PM CDT  Subject: Medication Management  Due: August 28, 2020 8:19:25 PM CDT     ** On Hold Pending Signature **     Dispensed Drug: albuterol (Ventolin HFA 90 mcg/inh inhalation aerosol), INHALE 2 PUFFS FOUR TIMES A DAY  Quantity: 18 unknown unit  Days Supply: 0  Refills: 0  Substitutions Allowed  Notes from Pharmacy:  ---------------------------------------------------------------  From: Clare Garcia   To: eRx Pool ([id number]_[place] [facility] [place]);     Sent: 9/1/2020 11:08:31 AM CDT  Subject: RE: Medication Management     please send rx for one inhaler with 1 refill, thanks---------------------  From: Vannesa Mcgowan MA   To: [facility]    Sent: 9/1/2020 11:14:21 AM CDT  Subject: RE: Medication Management     ** Submitted: **  Order:albuterol (Ventolin HFA 90 mcg/inh inhalation aerosol)  2 puff(s)  Inhale  qid  Qty:  1 EA        Refills:  1          Substitutions Allowed     Route To Pharmacy - [facility]    Signed by Vannesa Mcgowan MA  9/1/2020 4:14:00 PM [facility]    ** Not Approved:  **  albuterol " (VENTOLIN HFA INHALER MG INHALANT)  INHALE 2 PUFFS FOUR TIMES A DAY  Qty:  18 unknown unit        Days Supply:  0        Refills:  0          Substitutions Allowed     Route To Pharmacy - [facility]   Signed by Juan M TOM Vannesa

## 2022-02-16 NOTE — NURSING NOTE
Comprehensive Intake Entered On:  1/11/2019 10:08 AM CST    Performed On:  1/11/2019 9:58 AM CST by Myra Sigala               Summary   Chief Complaint :   Pt here for DM check   Weight Measured :   312.4 lb(Converted to: 312 lb 6 oz, 141.70 kg)    Height Measured :   69 in(Converted to: 5 ft 9 in, 175.26 cm)    Body Mass Index :   46.13 kg/m2 (HI)    Body Surface Area :   2.62 m2   Systolic Blood Pressure :   138 mmHg (HI)    Diastolic Blood Pressure :   82 mmHg (HI)    Mean Arterial Pressure :   101 mmHg   Peripheral Pulse Rate :   60 bpm   BP Site :   Right arm   Pulse Site :   Radial artery   BP Method :   Manual   HR Method :   Manual   Temperature Tympanic :   97.8 DegF(Converted to: 36.6 DegC)  (LOW)    Myra Sigala - 1/11/2019 9:58 AM CST   Health Status   Allergies Verified? :   Yes   Medication History Verified? :   Yes   Medical History Verified? :   Yes   Pre-Visit Planning Status :   Completed   Tobacco Use? :   Never smoker   Myra Sigala - 1/11/2019 9:58 AM CST   Consents   Consent for Immunization Exchange :   Consent Granted   Consent for Immunizations to Providers :   Consent Granted   Myra Sigala - 1/11/2019 9:58 AM CST   Meds / Allergies   (As Of: 1/11/2019 10:08:12 AM CST)   Allergies (Active)   Farxiga  Estimated Onset Date:   Unspecified ; Reactions:   rash ; Created By:   Myra Sigala; Reaction Status:   Active ; Category:   Drug ; Substance:   Farxiga ; Type:   Allergy ; Updated By:   Myra Sigala; Reviewed Date:   1/11/2019 10:05 AM CST      metFORMIN  Estimated Onset Date:   Unspecified ; Reactions:   diarrhea ; Created By:   Vannesa Mcgowan MA; Reaction Status:   Active ; Category:   Drug ; Substance:   metFORMIN ; Type:   Intolerance ; Updated By:   Vannesa Mcgowan MA; Source:   Patient ; Reviewed Date:   1/11/2019 10:05 AM CST        Medication List   (As Of: 1/11/2019 10:08:12 AM CST)   Prescription/Discharge Order    glipiZIDE  :   glipiZIDE ; Status:    Prescribed ; Ordered As Mnemonic:   glipiZIDE 10 mg oral tablet ; Simple Display Line:   1 tab(s), Oral, bid, 60 tab(s), 0 Refill(s) ; Ordering Provider:   Clare Garcia; Catalog Code:   glipiZIDE ; Order Dt/Tm:   1/2/2019 10:48:28 AM          triamcinolone topical  :   triamcinolone topical ; Status:   Processing ; Ordered As Mnemonic:   triamcinolone 0.5% topical cream ; Ordering Provider:   Clare Garcia; Action Display:   Complete ; Catalog Code:   triamcinolone topical ; Order Dt/Tm:   1/11/2019 10:08:06 AM          simvastatin  :   simvastatin ; Status:   Prescribed ; Ordered As Mnemonic:   simvastatin 40 mg oral tablet ; Simple Display Line:   40 mg, 1 tab(s), po, hs, 90 tab(s), 1 Refill(s) ; Ordering Provider:   Clare Garcia; Catalog Code:   simvastatin ; Order Dt/Tm:   6/27/2018 9:48:51 AM          metFORMIN  :   metFORMIN ; Status:   Prescribed ; Ordered As Mnemonic:   metFORMIN 500 mg oral tablet ; Simple Display Line:   500 mg, 1 tab(s), PO, BID, 180 tab(s), 1 Refill(s) ; Ordering Provider:   Clare Garcia; Catalog Code:   metFORMIN ; Order Dt/Tm:   6/27/2018 9:48:23 AM          Miscellaneous Prescription  :   Miscellaneous Prescription ; Status:   Prescribed ; Ordered As Mnemonic:   TRUETEST BGM STRIPS MG BEAD ; Simple Display Line:   See Instructions, TEST THREE TIMES A DAY TO FOUR TIMES A DAY, 50 unknown unit, 5 Refill(s) ; Ordering Provider:   Adrián Bass MD; Catalog Code:   Miscellaneous Prescription ; Order Dt/Tm:   2/1/2018 2:22:55 PM          albuterol  :   albuterol ; Status:   Prescribed ; Ordered As Mnemonic:   Ventolin HFA 90 mcg/inh inhalation aerosol ; Simple Display Line:   2 puff(s), inh, qid, 1 EA, 0 Refill(s) ; Ordering Provider:   Clare Garcia; Catalog Code:   albuterol ; Order Dt/Tm:   8/22/2017 9:17:27 AM          Miscellaneous Rx Supply  :   Miscellaneous Rx Supply ; Status:   Prescribed ; Ordered As Mnemonic:   True Test BGM Strips MG BEAD ; Simple  Display Line:   See Instructions, Test 3-4 times daily, 1 box(es) ; Ordering Provider:   Adrián Bass MD; Catalog Code:   Miscellaneous Rx Supply ; Order Dt/Tm:   4/15/2015 9:05:01 AM          mometasone nasal  :   mometasone nasal ; Status:   Prescribed ; Ordered As Mnemonic:   mometasone 50 mcg/inh nasal spray ; Simple Display Line:   2 spray(s), nasal, daily, 1 EA ; Ordering Provider:   Adrián Bass MD; Catalog Code:   mometasone nasal ; Order Dt/Tm:   8/6/2014 11:17:46 AM            Home Meds    aspirin  :   aspirin ; Status:   Documented ; Ordered As Mnemonic:   aspirin 325 mg oral tablet ; Simple Display Line:   325 mg, 1 tab(s), po, daily, 0 Refill(s) ; Catalog Code:   aspirin ; Order Dt/Tm:   5/3/2017 5:04:05 PM

## 2022-02-16 NOTE — TELEPHONE ENCOUNTER
Entered by Brandy Hatfield LPN on September 09, 2019 7:01:32 PM CDT  ---------------------  From: Brandy Hatfield LPN   To: [facility]    Sent: 9/9/2019 7:01:32 PM CDT  Subject: Medication Management     ** Submitted: **  Order:metFORMIN (metFORMIN 500 mg oral tablet, extended release)  2 tab(s)  Oral  bid  Qty:  120 tab(s)        Refills:  0          Substitutions Allowed     Route To Pharmacy - [facility]    Signed by Brandy Hatfield LPN  9/9/2019 7:00:00 PM    ** Submitted: **  Complete:metFORMIN (metFORMIN 500 mg oral tablet, extended release)   Signed by Brandy Hatfield LPN  9/9/2019 7:01:00 PM    ** Not Approved:  **  metFORMIN (METFORMIN-ER  500 MG  TAB TABLET)  TAKE 2 TABLETS BY MOUTH TWICE DAILY - DOSE CHANGE 01/11/2019  Qty:  360 unknown unit        Days Supply:  0        Refills:  0          Substitutions Allowed     Route To Pharmacy - [facility]   Signed by Brandy Hatfield LPN          Med Refill      Date of last office visit and reason:  1/11/19 Diabetes Type 2       Date of last Med Check / Px:   1/11/19  Date of last labs pertaining to med:  1/4/19    RTC order in chart:  yes- reminder letter was sent out 8/30/19    For Protocol refill, has patient been contacted:  reminder letter was sent. 30 day protocol sent to pharmacy with message that pt due for visit.        ** Patient matched by Brandy Hatfield LPN on 9/9/2019 6:57:54 PM CDT **      ------------------------------------------  From: [facility]  To: Clare Garcia  Sent: September 7, 2019 10:04:30 AM CDT  Subject: Medication Management  Due: September 8, 2019 10:04:30 AM CDT    ** On Hold Pending Signature **  Drug: metFORMIN (metFORMIN 500 mg oral tablet, extended release)  TAKE 2 TABLETS BY MOUTH TWICE DAILY - DOSE CHANGE 01/11/2019  Quantity: 360 unknown unit Days Supply: 0         Refills: 0  Substitutions Allowed  Notes from Pharmacy:     Dispensed Drug: metFORMIN (metFORMIN 500 mg oral tablet, extended release)  TAKE 2 TABLETS BY MOUTH TWICE  DAILY - DOSE CHANGE 01/11/2019  Quantity: 360 unknown unit Days Supply: 0         Refills: 0  Substitutions Allowed  Notes from Pharmacy:   ------------------------------------------

## 2022-02-16 NOTE — CARE COORDINATION
Pt appears on  [facility] chronic disease panel as out of parameters for elevated Alc and would benefit from pneumonia injection.   Steve's medicatons were changes but he had a reaction so he is back on his old DM plan.   WIth the changes he is now due for Alc in April. Letter was sent in December reflecting this.  He is aware of SANJANA here.

## 2022-02-16 NOTE — TELEPHONE ENCOUNTER
"---------------------  From: Vannesa Mcgowan MA (eRx Pool ([id number]_[facility]))   To: [facility] Message Pool ([id number]_[facility]);     Sent: 10/8/2020 11:24:20 AM CDT  Subject: FW: Medication Management   Due Date/Time: 10/7/2020 7:23:00 PM CDT     Med Refill      Date of last office visit and reason:  6/16/2020 w/ DS for DM ed      Date of last Med Check / Px:   10/18/19 w/ [facility] for DM f/u--pt was to see [facility] 6/16/2020 but was seen by DS instead  Date of last labs pertaining to med:  has lab appt 10/12/2020 but no appt w/ [facility] for f/u    Note:  med is \"completed\" off med list by DS 6/16/2020.   Please advise re: refill    RTC order in chart:  _    For Protocol refill, has patient been contacted:  _          ** Patient matched by Vannesa Mcgowan MA on 10/8/2020 11:17:15 AM CDT **      ------------------------------------------  From: [employer]  To: Clare Garcia  Sent: October 6, 2020 7:23:12 PM CDT  Subject: Medication Management  Due: October 2, 2020 11:09:35 PM CDT     ** On Hold Pending Signature **     Dispensed Drug: glipiZIDE (glipiZIDE 10 mg oral tablet), TAKE ONE TABLET BY MOUTH TWICE DAILY  Quantity: 180 unknown unit  Days Supply: 0  Refills: 0  Substitutions Allowed  Notes from Pharmacy:  ---------------------------------------------------------------  From: Ingrid Calvin LPN ([facility] Message Pool ([id number]_[facility]))   To: Clare Garcia;     Sent: 10/8/2020 6:10:19 PM CDT  Subject: FW: Medication Management   Due Date/Time: 10/7/2020 7:23:00 PM CDT---------------------  From: Clare Garcia   To: Mobile Messenger Pool ([id number]_[facility]);     Sent: 10/9/2020 7:19:55 AM CDT  Subject: RE: Medication Management     please call pharmacy and see if he is actually filling this rx, I suspect if Alexandra removed it from list she has him on other pharmaceuticals. Also have him schedule with me, [facility] said the last time he filled it was 9/9/20. I told them it was d/c but will call back if " there is any changes. Forward to Alexandra?---------------------  From: Myra Sigala (commercetools Pool ([id number]_[facility]))   To: Clare Garcia;     Sent: 10/9/2020 9:56:23 AM CDT  Subject: FW: Medication Management---------------------  From: Clare Garcia   To: [employer]etools Pool ([id number]_[facility]);     Sent: 10/9/2020 9:58:56 AM CDT  Subject: RE: Medication Management     yes, do not refill at this time, please forward to Alexandra as it looks like she started him on insulin as everything else was cost prohibitive---------------------  From: Myra Sigala ([facility] Message Pool ([id number]))   To: Marcella Moser;     Sent: 10/9/2020 10:22:59 AM CDT  Subject: FW: Medication ManagementAgree, pt should not be on glipizide if he is taking Lantus and Novolog as directed.---------------------  From: Marcella Moser   To: [facility]    Sent: 10/9/2020 12:04:01 PM CDT  Subject: FW: Medication Management     ** Not Approved: Refill not appropriate, Pt should be on both long and fast acting insulins **  glipiZIDE (GLIPIZIDE    TAB 10MG TABLET)  TAKE ONE TABLET BY MOUTH TWICE DAILY  Qty:  180 unknown unit        Days Supply:  0        Refills:  0          Substitutions Allowed     Route To Pharmacy - [facility]   Signed by Marcella Moser

## 2022-02-16 NOTE — LETTER
(Inserted Image. Unable to display)   October 02, 2019      TAWANA GILES      [address] [place], [place] [phone]        Dear TAWANA,    Thank you for selecting [facility] for your healthcare needs.  Below you will find the results of the recent tests done at our clinic.      Here are your lab results, Steve. The A1C is high. I look forward to seeing you to discuss. Thank you.      Result Name Current Result Previous Result Reference Range   Sodium Level (mmol/L) ((L)) 133 10/1/2019  136 4/23/2018 135 - 146   Potassium Level (mmol/L)  4.2 10/1/2019  4.3 4/23/2018 3.5 - 5.3   Chloride Level (mmol/L)  99 10/1/2019  105 4/23/2018 98 - 110   CO2 Level (mmol/L)  25 10/1/2019  24 4/23/2018 20 - 32   Glucose Level (mg/dL) ((H)) 283 10/1/2019 ((H)) 206 4/23/2018 65 - 99   BUN (mg/dL)  19 10/1/2019  18 4/23/2018 7 - 25   Creatinine Level (mg/dL)  0.88 10/1/2019  0.81 4/23/2018 0.70 - 1.33   BUN/Creat Ratio  NOT APPLICABLE 10/1/2019  NOT APPLICABLE 4/23/2018 6 - 22   eGFR (mL/min/1.73m2)  95 10/1/2019  99 4/23/2018 > OR = 60 -    eGFR  (mL/min/1.73m2)  111 10/1/2019  115 4/23/2018 > OR = 60 -    Calcium Level (mg/dL)  9.6 10/1/2019  9.2 4/23/2018 8.6 - 10.3   Hgb A1c ((H)) 12.0 10/1/2019 ((H)) 10.1 1/4/2019  - <5.7   Cholesterol (mg/dL)  136 10/1/2019  134 4/23/2018  - <200   Non-HDL Cholesterol  100 10/1/2019  97 4/23/2018  - <130   HDL (mg/dL) ((L)) 36 10/1/2019 ((L)) 37 4/23/2018 >40 -    Cholesterol/HDL Ratio  3.8 10/1/2019  3.6 4/23/2018  - <5.0   LDL  69 10/1/2019  71 4/23/2018    Triglyceride (mg/dL) ((H)) 223 10/1/2019 ((H)) 180 4/23/2018  - <150   U Microalbumin (mg/dL)  0.7 10/1/2019  0.6 4/23/2018 See Note: -    Ur Creatinine (mg/dL)  137 10/1/2019  20 - 320   Ur Microalbumin/Creatinine Ratio  5 10/1/2019   - <30       Please contact me or my assistant at [phone] if you have any questions or concerns.     Sincerely,        ANGUS Gardner-NP  Family Nurse  Practitioner      What do your labs mean?  Below is a glossary of commonly ordered labs:  LDL   Bad Cholesterol   HDL   Good Cholesterol  AST/ALT   Liver Function   Cr/Creatinine   Kidney Function  Microalbumin   Kidney Function  BUN   Kidney Function  PSA   Prostate    TSH   Thyroid Hormone  HgbA1c   Diabetes Test   Hgb (Hemoglobin)   Red Blood Cells  WBC   White Blood Cell Count

## 2022-02-16 NOTE — NURSING NOTE
Comprehensive Intake Entered On:  6/16/2020 9:37 AM CDT    Performed On:  6/16/2020 9:31 AM CDT by Ingrid Calvin LPN               Summary   Chief Complaint :   DM visit - never got results of labs that were done 2/2020   Weight Measured :   293 lb(Converted to: 293 lb 0 oz, 132.90 kg)    Height Measured :   69 in(Converted to: 5 ft 9 in, 175.26 cm)    Body Mass Index :   43.26 kg/m2 (HI)    Body Surface Area :   2.54 m2   Systolic Blood Pressure :   130 mmHg   Diastolic Blood Pressure :   66 mmHg   Mean Arterial Pressure :   87 mmHg   Peripheral Pulse Rate :   65 bpm   BP Site :   Right arm   BP Method :   Manual   Temperature Tympanic :   97.2 DegF(Converted to: 36.2 DegC)  (LOW)    Oxygen Saturation :   95 %   Ingrid Calvin LPN - 6/16/2020 9:31 AM CDT   Health Status   Allergies Verified? :   Yes   Medication History Verified? :   Yes   Medical History Verified? :   No   Pre-Visit Planning Status :   Completed   Tobacco Use? :   Never smoker   Ingrid Calvin LPN - 6/16/2020 9:31 AM CDT   Meds / Allergies   (As Of: 6/16/2020 9:37:31 AM CDT)   Allergies (Active)   Actos  Estimated Onset Date:   Unspecified ; Reactions:   rash ; Created By:   Clare Garcia; Reaction Status:   Active ; Category:   Drug ; Substance:   Actos ; Type:   Allergy ; Updated By:   Clare Garcia; Reviewed Date:   10/18/2019 10:04 AM CDT      Farxiga  Estimated Onset Date:   Unspecified ; Reactions:   rash ; Created By:   Myra Sigala; Reaction Status:   Active ; Category:   Drug ; Substance:   Farxiga ; Type:   Allergy ; Updated By:   Myra Sigala; Reviewed Date:   10/18/2019 10:04 AM CDT      metFORMIN  Estimated Onset Date:   Unspecified ; Reactions:   diarrhea ; Created By:   Vannesa Mcgowan MA; Reaction Status:   Active ; Category:   Drug ; Substance:   metFORMIN ; Type:   Intolerance ; Updated By:   Vannesa Mcgowan MA; Source:   Patient ; Reviewed Date:   10/18/2019 10:04 AM CDT        Medication List    (As Of: 6/16/2020 9:37:31 AM CDT)   Prescription/Discharge Order    Miscellaneous Prescription  :   Miscellaneous Prescription ; Status:   Prescribed ; Ordered As Mnemonic:   pen needles 31G 6mm ; Simple Display Line:   See Instructions, use new needle daily with Soliqua, 1 box(es), 1 Refill(s) ; Ordering Provider:   Clare Garcia; Catalog Code:   Miscellaneous Prescription ; Order Dt/Tm:   12/17/2019 2:18:08 PM CST          metFORMIN  :   metFORMIN ; Status:   Prescribed ; Ordered As Mnemonic:   metFORMIN 500 mg oral tablet, extended release ; Simple Display Line:   See Instructions, TAKE 2 TABLETS BY MOUTH TWICE DAILY, 360 tab(s), 0 Refill(s) ; Ordering Provider:   Clare Garcia; Catalog Code:   metFORMIN ; Order Dt/Tm:   4/22/2020 8:33:07 AM CDT          simvastatin  :   simvastatin ; Status:   Prescribed ; Ordered As Mnemonic:   simvastatin 40 mg oral tablet ; Simple Display Line:   See Instructions, TAKE ONE TABLET BY MOUTH AT BEDTIME, 90 tab(s), 0 Refill(s) ; Ordering Provider:   Clare Garcia; Catalog Code:   simvastatin ; Order Dt/Tm:   4/22/2020 8:33:08 AM CDT          glipiZIDE  :   glipiZIDE ; Status:   Prescribed ; Ordered As Mnemonic:   glipiZIDE 10 mg oral tablet ; Simple Display Line:   See Instructions, TAKE ONE TABLET BY MOUTH TWICE DAILY, 180 tab(s), 0 Refill(s) ; Ordering Provider:   Clare Garcia; Catalog Code:   glipiZIDE ; Order Dt/Tm:   4/22/2020 8:33:08 AM CDT          Miscellaneous Prescription  :   Miscellaneous Prescription ; Status:   Prescribed ; Ordered As Mnemonic:   TRUETEST BGM STRIPS MG BEAD ; Simple Display Line:   See Instructions, TEST THREE TIMES A DAY TO FOUR TIMES A DAY, 50 unknown unit, 5 Refill(s) ; Ordering Provider:   Adrián Bass MD; Catalog Code:   Miscellaneous Prescription ; Order Dt/Tm:   2/1/2018 2:22:55 PM CST          Miscellaneous Rx Supply  :   Miscellaneous Rx Supply ; Status:   Prescribed ; Ordered As Mnemonic:   True Test BGM Strips  MG BEAD ; Simple Display Line:   See Instructions, Test 3-4 times daily, 1 box(es) ; Ordering Provider:   Adrián Bass MD; Catalog Code:   Miscellaneous Rx Supply ; Order Dt/Tm:   4/15/2015 9:05:01 AM CDT            Home Meds    aspirin  :   aspirin ; Status:   Documented ; Ordered As Mnemonic:   aspirin 325 mg oral tablet ; Simple Display Line:   325 mg, 1 tab(s), po, daily, 0 Refill(s) ; Catalog Code:   aspirin ; Order Dt/Tm:   5/3/2017 5:04:05 PM CDT            ID Risk Screen   Recent Travel History :   No recent travel   Family Member Travel History :   No recent travel   Other Exposure to Infectious Disease :   Unknown   Ingrid Calvin LPN - 6/16/2020 9:31 AM CDT

## 2022-02-16 NOTE — TELEPHONE ENCOUNTER
---------------------  From: Clare Garcia   Sent: 6/16/2020 10:01:55 AM CDT  Subject: General Message     Steve was on my schedule today but I asked Alexandra Moser RD to see him instead as he is not having success with current DM med management and he would like to consider insulin start. She agreed to see him now and I escorted him to unit 1.

## 2022-02-16 NOTE — LETTER
(Inserted Image. Unable to display)   February 26, 2020      TAWANA GILES      [address] [place], [place] [phone]        Dear TAWANA,    Thank you for selecting [facility] for your healthcare needs.  Below you will find the results of the recent tests done at our clinic.      The A1c remains elevated, Tawana.  I would like you to meet with Alexandra Moser RD about starting insulin as I am afraid other options have not worked due to med reactions or simply affordability.  Prolonged poor control of diabetes is going to lead to eye disease, vascular disease, heart attack and stroke if it is not reigned in.  You are very capable of insulin and I think you will find you have more energy if the blood sugars are better controlled.  Thanks, Steve.      Result Name Current Result Previous Result Reference Range   Hgb A1c ((H)) 12.0 2/24/2020 ((H)) 12.0 10/1/2019  - <5.7       Please contact me or my assistant at [phone] if you have any questions or concerns.     Sincerely,        TING Gardner  Family Nurse Practitioner      What do your labs mean?  Below is a glossary of commonly ordered labs:  LDL   Bad Cholesterol   HDL   Good Cholesterol  AST/ALT   Liver Function   Cr/Creatinine   Kidney Function  Microalbumin   Kidney Function  BUN   Kidney Function  PSA   Prostate    TSH   Thyroid Hormone  HgbA1c   Diabetes Test   Hgb (Hemoglobin)   Red Blood Cells  WBC   White Blood Cell Count

## 2022-02-16 NOTE — TELEPHONE ENCOUNTER
---------------------  From: Myra Sigala   To: Care Coordinators Pool ([facility]);     Sent: 10/18/2019 10:46:40 AM CDT  Subject: care coordination/DM meds     Pt saw [facility] today and [facility] would like CC to call patient to discuss DM meds. Pt is self-pay so declined to see Alexandra Moser, but needs better DM medication that he is not allergic to- see NCB's note.can you provide input on this one?---------------------  From: Kemi Umaña CMA (Care Coordinators Pool ([facility])   To: Marcella Moser;     Sent: 10/21/2019 3:37:47 PM CDT  Subject: FW: care coordination/DM meds---------------------  From: Marcella Moser   To: Kemi Umaña CMA;     Sent: 10/21/2019 5:09:36 PM CDT  Subject: RE: care coordination/DM meds     Yes, I left a note for Clare.    Recommend Soliqua 100/33 starting at 15u injection take within 1 hour of eating the first meal of the day.  Continue to increase dose until am BG is in goal range.    $700 monthly discount card x 1 year should be free for him.      I wouldn't have to meet with him for an hour.  It could be a 15 min appointment...how much would it be for a 15 min appt? Just in case he asks...---------------------  From: Kemi Umaña CMA   To: Marcella Moser;     Sent: 10/22/2019 1:05:58 PM CDT  Subject: RE: care coordination/DM medsNCB talked to pt

## 2022-02-16 NOTE — LETTER
(Inserted Image. Unable to display)            June 06, 2021      TAWANA GILES      [address] [place] [phone]        Dear TAWANA,    Thank you for selecting [facility] for your healthcare needs.  Below you will find the results of the recent tests done at our clinic.      Cal Branhame,  Your A1C continues to be excellent! Great work!  Please schedule to see me or at the very least set up a video appointment with me to discuss meds, results, etc.  Thanks so much.      Result Name Current Result Previous Result Reference Range   Hgb A1c ((H)) 7.4 6/1/2021 ((H)) 8.4 10/12/2020  - <5.7       Please contact me or my assistant at [phone] if you have any questions or concerns.     Sincerely,        ANGUS Gardner-NP  Family Nurse Practitioner      What do your labs mean?  Below is a glossary of commonly ordered labs:  LDL   Bad Cholesterol   HDL   Good Cholesterol  AST/ALT   Liver Function   Cr/Creatinine   Kidney Function  Microalbumin   Kidney Function  BUN   Kidney Function  PSA   Prostate    TSH   Thyroid Hormone  HgbA1c   Diabetes Test   Hgb (Hemoglobin)   Red Blood Cells  WBC   White Blood Cell Count

## 2022-02-16 NOTE — NURSING NOTE
Quick Intake Entered On:  2/24/2020 10:19 AM CST    Performed On:  2/24/2020 10:02 AM CST by Cindy Schilling RN               Summary   Chief Complaint :   BP   Height Measured :   69 in(Converted to: 5 ft 9 in, 175.26 cm)    Systolic Blood Pressure :   124 mmHg   Diastolic Blood Pressure :   70 mmHg   Mean Arterial Pressure :   88 mmHg   BP Site :   Right arm   BP Method :   Manual   Cindy Schilling RN - 2/24/2020 10:19 AM CST

## 2022-02-16 NOTE — NURSING NOTE
Quick Intake Entered On:  10/12/2020 9:49 AM CDT    Performed On:  10/12/2020 9:48 AM CDT by Daisy Sotelo RN               Summary   Height Measured :   69 in(Converted to: 5 ft 9 in, 175.26 cm)    Systolic Blood Pressure :   124 mmHg   Diastolic Blood Pressure :   76 mmHg   Mean Arterial Pressure :   92 mmHg   Daisy Sotelo RN - 10/12/2020 9:48 AM CDT

## 2022-02-16 NOTE — LETTER
(Inserted Image. Unable to display)   February 05, 2021      TAWANA GILES  [address] [place] [phone]        Dear TAWANA,      Thank you for selecting [facility] for your healthcare needs.       Cal Wilson,  Congratulations! Your A1C is the best I have seen in years!!!  A1c is 7.1, this is in the desired range.   I think the plan is to do a video or phone visit with you next week, Steve. Please set that up. You will be do for all your diabetes labs in April but let's bump that to May, then we can repeat the A1c and make sure it is holding as well. Please get the May lab work scheduled. Way to GO!    Clare          Please contact me or my assistant at [phone] if you have any questions or concerns.     Sincerely,        ANGUS Gardner-NP  Family Nurse Practitioner

## 2022-02-16 NOTE — TELEPHONE ENCOUNTER
---------------------  From: Clare Garcia   To: Care Coordinators [facility]);     Sent: 1/11/2019 2:44:32 PM CST  Subject: General Message     please contact Steve and hook up with pt assist program for DM med add on, thanks---------------------  From: Margie English CMA (Care Coordinators [facility])   To: Margie English CMA;     Sent: 1/14/2019 7:42:23 AM CST  Subject: FW: General Message---------------------  From: Margie English CMA   To: Marcella Moser;     Sent: 1/15/2019 3:36:41 PM CST  Subject: FW: General Message     Alexandra, this is a [facility]B pt.  Can you help?Spoke with Alexandra hough for Steglujan 5mg/100mg tablets.  This medication works likethe Farxiga as it excretes sugars through your urine, but it is a different chemical make up.  Card works with privately insured patients.  LMTCB at 345pm on 01/22/19.    Margie English CMALMTCB at 0911am [id number] at 0354pm on 03/5/19

## 2022-02-16 NOTE — PROGRESS NOTES
Patient:   TAWANA GILES            MRN: [id number]            FIN: [id number]               Age:   58 years     Sex:  Male     :  1960   Associated Diagnoses:   Diabetes mellitus   Author:   Clare Garcia      Visit Information      Date of Service: 2019 09:49 am  Performing Location: [facility]  Encounter#: [id number]      Primary Care Provider (PCP):  Clare Garcia    NPI# [id number]      Referring Provider:  Clare Garcia# [id number]   Visit type:  General concerns.    Accompanied by:  No one.    Source of history:  Self.       Chief Complaint   2019 9:58 AM CST    Pt here for DM check   diabetes check      History of Present Illness                   The patient presents for follow-up evaluation of diabetes, Steve continues to struggle with DM  he has had reactions to Actos and Farxiga and will not take these  he believes he tolerated the the extended release metformin but the pharmacy was not giving it to him, so he owuldlike me to send rx for extended release and he will try and take the max dose per day.  He is willing to try another medication but cost is a large concern  He has a high deductible    He has not gained wt but struggles to follow DM diet--has toast and peanut butter in morning, trieds for a variety of foods  Has many family stressors at home with his 3 grown [employer]  Farms full timej--chops wood/cleans hiren and is very physically active without CP or SOB  non smoker  is overdue for an eye exam, I emphasized the need for this  Denies numbness in his feet.        Review of Systems   Constitutional:  No fever, No chills.    Eye:  No recent visual problem.    Ear/Nose/Mouth/Throat:  No nasal congestion, No sore throat.    Respiratory:  No shortness of breath, No cough.    Cardiovascular:  No chest pain.    Gastrointestinal:  No nausea, No vomiting.    Genitourinary   Hematology/Lymphatics   Endocrine   Immunologic   Musculoskeletal    Integumentary:  No rash.    Neurologic:  Alert and oriented X4.    Psychiatric:  Negative.    All other systems reviewed and negative      Health Status   Allergies:    Allergic Reactions (Selected)  Severity Not Documented  Actos (Rash)  Farxiga (Rash)  Nonallergic Reactions (Selected)  Severity Not Documented  MetFORMIN (Diarrhea)   Medications:  (Selected)   Prescriptions  Prescribed  TRUETEST BGM STRIPS MG BEAD: See Instructions, Instructions: TEST THREE TIMES A DAY TO FOUR TIMES A DAY, # 50 unknown unit, 5 Refill(s), Type: Soft Stop, Pharmacy: [facility], TEST THREE TIMES A DAY TO FOUR TIMES A DAY  True Test BGM Strips MG BEAD: True Test BGM Strips MG BEAD, See Instructions, Instructions: Test 3-4 times daily, Supply, # 1 box(es), 5 Refill(s), Type: Maintenance, Pharmacy: [facility], Test 3-4 times daily  Ventolin HFA 90 mcg/inh inhalation aerosol: 2 puff(s), inh, qid, # 1 EA, 0 Refill(s), Type: Maintenance, Pharmacy: [facility], pt due for OV soon for 3month f/u per Clare Saenz.  glipiZIDE 10 mg oral tablet: = 1 tab(s), Oral, bid, # 180 tab(s), 1 Refill(s), Type: Maintenance, Pharmacy: [facility], 1 tab(s) Oral bid  metFORMIN 500 mg oral tablet, extended release: = 2 tab(s) ( 1,000 mg ), po, bid, Instructions: please dispense this rather than the regular  metformin, # 360 tab(s), 1 Refill(s), Type: Maintenance, Pharmacy: [facility], 2 tab(s) Oral bid,Instr:please dispense this rather than the regular  metformin...  metFORMIN 500 mg oral tablet: 1 tab(s) ( 500 mg ), PO, BID, # 180 tab(s), 1 Refill(s), Type: Maintenance, Pharmacy: [facility], 1 tab(s) po bid  mometasone 50 mcg/inh nasal spray: 2 spray(s), nasal, daily, # 1 EA, 2 Refill(s), Type: Maintenance, Pharmacy: [facility], 2 spray(s) nasal daily  simvastatin 40 mg oral tablet: = 1 tab(s) ( 40 mg ), po, hs, # 90 tab(s), 1 Refill(s), Type: Maintenance, Pharmacy: [facility], 1 tab(s) Oral hs  Documented Medications  Documented  aspirin 325  mg oral tablet: 1 tab(s) ( 325 mg ), po, daily, 0 Refill(s), Type: Maintenance,    Medications          *denotes recorded medication          TRUETEST BGM STRIPS MG BEAD: See Instructions, TEST THREE TIMES A DAY TO FOUR TIMES A DAY, 50 unknown unit, 5 Refill(s).          True Test BGM Strips MG BEAD: See Instructions, Test 3-4 times daily, 1 box(es).          Ventolin HFA 90 mcg/inh inhalation aerosol: 2 puff(s), inh, qid, 1 EA, 0 Refill(s).          *aspirin 325 mg oral tablet: 325 mg, 1 tab(s), po, daily, 0 Refill(s).          glipiZIDE 10 mg oral tablet: 1 tab(s), Oral, bid, 180 tab(s), 1 Refill(s).          metFORMIN 500 mg oral tablet: 500 mg, 1 tab(s), PO, BID, 180 tab(s), 1 Refill(s).          metFORMIN 500 mg oral tablet, extended release: 1,000 mg, 2 tab(s), po, bid, please dispense this rather than the regular  metformin, 360 tab(s), 1 Refill(s).          mometasone 50 mcg/inh nasal spray: 2 spray(s), nasal, daily, 1 EA.          simvastatin 40 mg oral tablet: 40 mg, 1 tab(s), po, hs, 90 tab(s), 1 Refill(s).   Problem list:    All Problems  Diabetes mellitus, type II / SNOMED CT 358723450 / Confirmed  Family History of Colon Cancer / ICD-9-CM V16.0 / Confirmed  Obesity / ICD-9-.00 / Confirmed  Tubular adenoma of colon / SNOMED CT 0561818164 / Confirmed  Long term current use of oral hypoglycemic drug / SNOMED CT 154570350 / Confirmed  Canceled: Diabetes mellitus type II.. / ICD-9-.00      Histories   Past Medical History:    Active  Diabetes mellitus, type II (231841644): Onset in the month of 2009 at 49 years  Obesity (278.00)   Family History:    CA - Lung cancer  Mother ()  CA - Cancer  Father ()  Comments:  3/24/2010 11:40 AM CDT - Paulino GONZALEZ, Rossy  kidney ca  CA - Cancer of colon  Father ()  Accident  Sister ()     Procedure history:    Colonoscopy (SNOMED CT 805986012) performed by Lawrence Loredo MD on 2015 at 55 Years.  Comments:  2015 5:15  PM CDT - Lawrence Loredo MD  divertculosis extending to the transverse    4/23/2015 5:06 PM Lawrence Engle MD  Indication: Family history  Sedation: MAC  Findings: Tubular adenoma Proximal ascending  Recommendation: Colonoscopy in 5 years  Colonoscopy (SNOMED CT 090123092) in the month of 1/2009 at 48 Years.  Comments:  11/8/2011 6:15 PM CST - Yocasta Carroll 2014.  Repair of ventral hernia (SNOMED CT 434400593) in the week of 10/11/2002 at 42 Years.  Repair of ventral hernia (SNOMED CT 155156131) in 1988 at 28 Years.  back surgery in 1980 at 20 Years.  Comments:  3/24/2010 11:36 AM CDT - Rossy Bob  ?1980's  Vasectomy (SNOMED CT 47763857).   Social History:        Alcohol Assessment: Current                     Comments:                      11/08/2011 - Yocasta Carroll                     3 to 4 beers once a week.      Tobacco Assessment: Denies Tobacco Use      Substance Abuse Assessment: Denies Substance Abuse      Employment and Education Assessment                     Comments:                      11/08/2011 - Yocasta Carroll.      Home and Environment Assessment            Marital status: .                     Comments:                      11/08/2011 - Yocasta Carroll - Marie.,        Alcohol Assessment: Current                     Comments:                      11/08/2011 - Yocasta Carroll                     3 to 4 beers once a week.      Tobacco Assessment: Denies Tobacco Use      Substance Abuse Assessment: Denies Substance Abuse      Employment and Education Assessment                     Comments:                      11/08/2011 - Yocasta Carroll.      Home and Environment Assessment            Marital status: .                     Comments:                      11/08/2011 - Yocasta Carroll.      Physical Examination   Vital Signs   1/11/2019 9:58 AM CST  Temperature Tympanic 97.8 DegF  LOW    Peripheral Pulse Rate 60 bpm    Pulse Site Radial artery    HR Method Manual    Systolic Blood Pressure 138 mmHg  HI    Diastolic Blood Pressure 82 mmHg  HI    Mean Arterial Pressure 101 mmHg    BP Site Right arm    BP Method Manual      General:  Alert and oriented, No acute distress.    Eye:  Normal conjunctiva.    HENT:  Normocephalic, Tympanic membranes are clear, Oral mucosa is moist, No pharyngeal erythema.    Neck:  Supple, Non-tender, No lymphadenopathy.    Respiratory:  Lungs are clear to auscultation, Respirations are non-labored, Breath sounds are equal.    Cardiovascular:  Normal rate, Regular rhythm, No murmur, Good pulses equal in all extremities, Normal peripheral perfusion.    Gastrointestinal:  Soft, Non-tender, Non-distended, No organomegaly.    Musculoskeletal:  Normal range of motion, Normal gait.    Integumentary:  Warm, Pink, Moist, No rash.    Neurologic:  Alert, Oriented, monfilament testing of both feet normal.    Psychiatric:  Cooperative.       Review / Management   Results review:  Lab results: 1/4/2019 10:05 AM CST    Hgb A1c                   10.1  HI  .       Impression and Plan   Diagnosis     Diabetes mellitus ([id number] E11.9).     Course:  Worsening, A1C is climbing  will consult care coordinators , hopefully we can connect him with a patient assist program for a different DM med  counseled on avoiding carbs/breads/rice/pasta  WIll increase metformin with extended release  Consulted with Dr Morejon--may need to add NP insulin if drug assisstance program does not come together  .    Orders   Dx and Plan   Counseled:  Patient, Regarding medications, Diet.         Regarding treatment: Diabetes.

## 2022-02-16 NOTE — TELEPHONE ENCOUNTER
Entered by Anna Avitia CMA on May 26, 2021 1:13:25 PM CDT  ---------------------  From: Anna Avitia CMA   To: [facility]    Sent: 5/26/2021 1:13:25 PM CDT  Subject: Medication Management     ** Submitted: **  Order:metFORMIN (MetFORMIN (Eqv-Glucophage XR) 500 mg oral tablet, extended release)  2 tab(s)  Oral  bid  Qty:  120 tab(s)        Refills:  0          Substitutions Allowed     Route To Pharmacy - [facility]    Signed by Anna Avitia CMA  5/26/2021 6:12:00 PM [facility]    ** Submitted: **  Complete:metFORMIN (MetFORMIN (Eqv-Glucophage XR) 500 mg oral tablet, extended release)   Signed by Anna Avitia CMA  5/26/2021 6:13:00 PM [facility]    ** Not Approved:  **  metFORMIN (METFORMIN HYDROCHLORIDE ER 500MG ER TABLET ER 24HR)  TAKE 2 TABLETS BY MOUTH TWICE DAILY  Qty:  360 EA        Days Supply:  90        Refills:  5          Substitutions Allowed     Route To [facility] - [facility]   Signed by Anna Avitia CMA            ------------------------------------------  From: [facility]  To: Clare Garcia  Sent: May 26, 2021 12:39:49 PM CDT  Subject: Medication Management  Due: May 14, 2021 8:13:56 PM CDT     ** On Hold Pending Signature **     Drug: metFORMIN (MetFORMIN (Eqv-Glucophage XR) 500 mg oral tablet, extended release), TAKE 2 TABLETS BY MOUTH TWICE DAILY  Quantity: 360 EA  Days Supply: 90  Refills: 5  Substitutions Allowed  Notes from Pharmacy:     Dispensed Drug: metFORMIN (MetFORMIN (Eqv-Glucophage XR) 500 mg oral tablet, extended release), TAKE 2 TABLETS BY MOUTH TWICE DAILY  Quantity: 360 EA  Days Supply: 90  Refills: 5  Substitutions Allowed  Notes from Pharmacy:  ------------------------------------------12/23/20 preop  rtc due now for DM check  reminder letter sent 5/10/21  protocol fill

## 2022-02-16 NOTE — PROGRESS NOTES
Patient:   TAWANA GILES            MRN: [id number]            FIN: [id number]               Age:   57 years     Sex:  Male     :  1960   Associated Diagnoses:   Diabetes mellitus; Obesity   Author:   Clare Garcia      Visit Information      Date of Service: 2017 01:41 pm  Performing Location: [facility]  Encounter#: [id number]      Primary Care Provider (PCP):  Clare Garcia    NPI# [id number]      Referring Provider:  No referring provider recorded for selected visit.   Visit type:  General concerns.    Accompanied by:  No one.    Source of history:  Self.       Chief Complaint   2017 1:44 PM CDT     DM check     diabetes check      History of Present Illness   Concerning symptoms as listed in Chief Complaint above discussed and confirmed with patient                The patient presents for follow-up evaluation of diabetes.     He has been diabetic approx 8 years.   A1c used to be well controlled with Metformin but then developed diarrhea so had to stop it  He believes he is taking ACtos only  Has lost some wt, admits to liking bread and carbs.   Willing to see RD has been omany years since seen  Feels well  Long over due for eye exam      Review of Systems   Constitutional:  No fever, No chills.    Eye:  No recent visual problem.    Ear/Nose/Mouth/Throat:  No nasal congestion, No sore throat.    Respiratory:  No shortness of breath, No cough.    Cardiovascular:  No chest pain.    Gastrointestinal:  No nausea, No vomiting.    Genitourinary   Hematology/Lymphatics   Endocrine   Immunologic   Musculoskeletal   Integumentary:  No rash.    Neurologic:  Alert and oriented X4.    Psychiatric:  Negative.    All other systems reviewed and negative      Health Status   Allergies:    Nonallergic Reactions (Selected)  Severity Not Documented  MetFORMIN (Diarrhea)   Medications:  (Selected)   Prescriptions  Prescribed  True Test BGM Strips MG BEAD: True Test BGM Strips MG BEAD, See  Instructions, Instructions: Test 3-4 times daily, Supply, # 1 box(es), 5 Refill(s), Type: Maintenance, Pharmacy: [facility], Test 3-4 times daily  Ventolin HFA 90 mcg/inh inhalation aerosol: 2 puff(s), inh, qid, # 1 EA, 0 Refill(s), Type: Maintenance, Pharmacy: [facility]  betamethasone dipropionate 0.05% topical cream: 1 matthew, TOP, BID, # 50 gm, 1 Refill(s), Type: Maintenance, Pharmacy: [facility], 1 matthew top bid  glipiZIDE 10 mg oral tablet: 1 tab(s) ( 10 mg ), po, bid, # 180 tab(s), 1 Refill(s), Type: Maintenance, Pharmacy: [facility], Due for visit prior to additional refills. Thanks!, 1 tab(s) po bid  metFORMIN 500 mg oral tablet, extended release: See Instructions, Instructions: 1 tab(s) PO with evening meal x1 week, then increase to 2 tabs x1 week, then 3 tabs x1week then 4 tabs at evenin meal., # 70 tab(s), 0 Refill(s), Type: Maintenance, Pharmacy: [facility], 1 tab(s) PO with evening meal...  mometasone 50 mcg/inh nasal spray: 2 spray(s), nasal, daily, # 1 EA, 2 Refill(s), Type: Maintenance, Pharmacy: [facility], 2 spray(s) nasal daily  simvastatin 40 mg oral tablet: 1 tab(s) ( 40 mg ), po, hs, # 90 tab(s), 1 Refill(s), Type: Maintenance, Pharmacy: [facility], 1 tab(s) po hs   Problem list:    All Problems  Diabetes mellitus, type II / SNOMED CT 201434224 / Confirmed  Obesity / ICD-9-.00 / Confirmed  Tubular adenoma of colon / SNOMED CT 4640033121 / Confirmed  Family History of Colon Cancer / ICD-9-CM V16.0 / Confirmed  Canceled: Diabetes mellitus type II.. / ICD-9-.00      Histories   Past Medical History:    Active  Diabetes mellitus, type II (252848219): Onset in the month of 2009 at 49 years  Obesity (278.00)   Family History:    CA - Lung cancer  Mother ()  CA - Cancer  Father ()  Comments:  3/24/2010 11:40 AM - Paulino GONZALEZ, Rossy  kidney ca  CA - Cancer of colon  Father ()  Accident  Sister ()     Procedure history:    Colonoscopy (SNOMED CT  921070792) performed by Lawrence Loredo MD on 4/22/2015 at 55 Years.  Comments:  4/23/2015 5:15 PM - Lawrence Loredo MD  divertculosis extending to the transverse    4/23/2015 5:06 PM - Lawrence Loredo MD  Indication: Family history  Sedation: MAC  Findings: Tubular adenoma Proximal ascending  Recommendation: Colonoscopy in 5 years  Colonoscopy (SNOMED CT 872116790) in the month of 1/2009 at 48 Years.  Comments:  11/8/2011 6:15 PM - Yocasta Carroll 2014.  Repair of ventral hernia (SNOMED CT 323266158) in the week of 10/11/2002 at 42 Years.  Repair of ventral hernia (SNOMED CT 024378354) in 1988 at 28 Years.  back surgery in 1980 at 20 Years.  Comments:  3/24/2010 11:36 AM - Rossy Bob  ?1980's  Vasectomy (SNOMED CT 14789061).   Social History:        Alcohol Assessment: Current                     Comments:                      11/08/2011 - Yocasta Carroll                     3 to 4 beers once a week.      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      Home and Environment Assessment            Marital status: .                     Comments:                      11/08/2011 - Yocasta Carroll - Marie.  ,        Alcohol Assessment: Current                     Comments:                      11/08/2011 Yocasta Martin                     3 to 4 beers once a week.      Tobacco Assessment: Denies Tobacco Use      Substance Abuse Assessment: Denies Substance Abuse      Employment and Education Assessment                     Comments:                      11/08/2011 Yocasta Martin.      Home and Environment Assessment            Marital status: .                     Comments:                      11/08/2011 - Yocasta Carroll                      Wife - Marie.        Physical Examination   Vital Signs   5/2/2017 1:44 PM CDT Temperature Tympanic 97.9 DegF    Peripheral Pulse Rate 66 bpm    Pulse Site Radial artery    HR Method Manual    Systolic Blood Pressure 128 mmHg    Diastolic Blood Pressure 76 mmHg    Mean Arterial Pressure 93 mmHg    BP Site Right arm    BP Method Manual      General:  Alert and oriented, No acute distress.    Eye:  Pupils are equal, round and reactive to light, Extraocular movements are intact, Normal conjunctiva.    HENT:  Normocephalic, Tympanic membranes are clear, Oral mucosa is moist, No pharyngeal erythema.    Neck:  Supple, Non-tender, No jugular venous distention, No lymphadenopathy.    Respiratory:  Lungs are clear to auscultation, Respirations are non-labored, Breath sounds are equal.    Cardiovascular:  Normal rate, Regular rhythm, No murmur, Good pulses equal in all extremities, Normal peripheral perfusion.    Musculoskeletal:  Normal range of motion, Normal gait.    Integumentary:  Warm, Pink, Moist, No rash.    Neurologic:  Alert, Oriented, Cranial Nerves II-XII are grossly intact.    Psychiatric:  Cooperative.       Impression and Plan   Diagnosis     Diabetes mellitus (RYB24-ES E11.9).     Obesity (RZS02-DI E66.01).     Orders     Orders (Selected)   Prescriptions  Prescribed  betamethasone dipropionate 0.05% topical cream: 1 amtthew, TOP, BID, # 50 gm, 1 Refill(s), Type: Maintenance, Pharmacy: [facility], 1 matthew top bid  metFORMIN 500 mg oral tablet, extended release: See Instructions, Instructions: 1 tab(s) PO with evening meal x1 week, then increase to 2 tabs x1 week, then 3 tabs x1week then 4 tabs at evenin meal., # 70 tab(s), 0 Refill(s), Type: Maintenance, Pharmacy: [facility], 1 tab(s) PO with evening meal....     Patient Instructions:       Counseled: Patient, Verbalized understanding, See Alexandra Moser RD for dietary consult AND consult regarding med management    APpt with Eye clinic  Start daily aspirin  f/u  in 3 months  Will restart metform but trial extended release and see if better tolerated.    Dx and Plan   Counseled:  Patient, Regarding medications, Diet.         Regarding treatment: Diabetes.

## 2022-02-16 NOTE — TELEPHONE ENCOUNTER
---------------------  From: Daisy Sotelo RN   To: [facility] Message Pool ([id number]);     Sent: 10/12/2020 9:50:00 AM CDT  Subject: BP     resting BP check 124/76---------------------  From: Juan M TOM, Vannesa ([facility] Message Pool ([id number]_[facility]))   To: Clare Garcia;     Sent: 10/12/2020 11:22:18 AM CDT  Subject: FW: BPnoted

## 2022-02-16 NOTE — TELEPHONE ENCOUNTER
---------------------  From: Kalyani Burton ([facility] Pool ([id number]_[facility]))   To: [facility] [place] ([address]);     Sent: 6/29/2021 3:25:27 PM CDT  Subject: FW: Medication Management   Due Date/Time: 6/29/2021 2:22:00 PM CDT       PCP:  Clare Saenz    Medication:  Metformin 500 bid  Last Filled:  5/26/21    Quantity: 120  Refills:  0    Date of last office visit and reason:  6/16/2020 DM check  Date of last labs pertaining to condition:  10/12/2020 and A1C 6/1/2021    Note:  _      Return to Clinic order placed?  yes, due for visit          ------------------------------------------  From: [place]  To: Clare Garcia  Sent: June 28, 2021 2:22:49 PM CDT  Subject: Medication Management  Due: June 4, 2021 8:25:53 PM CDT     ** On Hold Pending Signature **     Drug: metFORMIN (MetFORMIN (Eqv-Glucophage XR) 500 mg oral tablet, extended release), TWO TAB(S) ORAL TWICE DAILY  Quantity: 120 EA  Days Supply: 30  Refills: 0  Substitutions Allowed  Notes from Pharmacy:     Dispensed Drug: metFORMIN (MetFORMIN (Eqv-Glucophage XR) 500 mg oral tablet, extended release), TWO TAB(S) ORAL TWICE DAILY  Quantity: 120 EA  Days Supply: 30  Refills: 0  Substitutions Allowed  Notes from Pharmacy:  ---------------------------------------------------------------  From: Ermias Son PA-C   To: [facility]    Sent: 6/29/2021 3:34:02 PM CDT  Subject: FW: Medication Management     ** Submitted: **  Complete:metFORMIN (MetFORMIN (Eqv-Glucophage XR) 500 mg oral tablet, extended release)   Signed by Ermias Son PA-C  6/29/2021 8:34:00 PM [facility]    ** Approved **  metFORMIN (METFORMIN HYDROCHLORIDE ER 500MG ER TABLET ER 24HR)  TWO TAB(S) ORAL TWICE DAILY  Qty:  120 EA        Days Supply:  30        Refills:  0          Substitutions Allowed     Route To Pharmacy - [facility]   Signed by Ermias Son PA-C

## 2022-02-16 NOTE — CARE COORDINATION
Pt appears on  [facility] chronic disease panel as out of parameters for elevated Alc.      Note in chart states that Ermias has a very large deductible and cost of medications can be difficult for him.     When he was in the end of August.  Clare talked to DS and they decided on Farxiga for him to try with a coupon.  I want to see if that coupon worted fo him and he was able to save some money and see how his BS readings were as well.   Ermias is also due for a pneumovax and he may be able to get this though the pharmacy cheaper.    Will address with him when he calls.  If he is taking th Farxiga and his BS number are improving we could check his Alc the end of November first part of Dec.  Using direct access labs for an Alc here are $15.00.

## 2022-02-16 NOTE — NURSING NOTE
Comprehensive Intake Entered On:  10/18/2019 10:05 AM CDT    Performed On:  10/18/2019 9:59 AM CDT by Myra Sigala               Summary   Chief Complaint :   Pt here for DM check   Weight Measured :   301.4 lb(Converted to: 301 lb 6 oz, 136.71 kg)    Height Measured :   69 in(Converted to: 5 ft 9 in, 175.26 cm)    Body Mass Index :   44.5 kg/m2 (HI)    Body Surface Area :   2.58 m2   Systolic Blood Pressure :   124 mmHg   Diastolic Blood Pressure :   70 mmHg   Mean Arterial Pressure :   88 mmHg   Peripheral Pulse Rate :   72 bpm   BP Site :   Right arm   Pulse Site :   Radial artery   BP Method :   Manual   HR Method :   Manual   Temperature Tympanic :   97.8 DegF(Converted to: 36.6 DegC)  (LOW)    Myra Sigala - 10/18/2019 9:59 AM CDT   Health Status   Allergies Verified? :   Yes   Medication History Verified? :   Yes   Medical History Verified? :   Yes   Pre-Visit Planning Status :   Completed   Tobacco Use? :   Never smoker   Myra Sigala - 10/18/2019 9:59 AM CDT   Consents   Consent for Immunization Exchange :   Consent Granted   Consent for Immunizations to Providers :   Consent Granted   Myra Sigala - 10/18/2019 9:59 AM CDT   Meds / Allergies   (As Of: 10/18/2019 10:05:29 AM CDT)   Allergies (Active)   Actos  Estimated Onset Date:   Unspecified ; Reactions:   rash ; Created By:   Clare Garcia; Reaction Status:   Active ; Category:   Drug ; Substance:   Actos ; Type:   Allergy ; Updated By:   Clare Garcia; Reviewed Date:   10/18/2019 10:04 AM CDT      Farxiga  Estimated Onset Date:   Unspecified ; Reactions:   rash ; Created By:   Myra Sigala; Reaction Status:   Active ; Category:   Drug ; Substance:   Farxiga ; Type:   Allergy ; Updated By:   Myra Sigala; Reviewed Date:   10/18/2019 10:04 AM CDT      metFORMIN  Estimated Onset Date:   Unspecified ; Reactions:   diarrhea ; Created By:   Vannesa Mcgowan MA; Reaction Status:   Active ; Category:   Drug ; Substance:    metFORMIN ; Type:   Intolerance ; Updated By:   Vannesa Mcgowan MA; Source:   Patient ; Reviewed Date:   10/18/2019 10:04 AM CDT        Medication List   (As Of: 10/18/2019 10:05:29 AM CDT)   Prescription/Discharge Order    metFORMIN  :   metFORMIN ; Status:   Prescribed ; Ordered As Mnemonic:   metFORMIN 500 mg oral tablet, extended release ; Simple Display Line:   2 tab(s), Oral, bid, for 14 day(s), 56 tab(s), 0 Refill(s) ; Ordering Provider:   Clare Garcia; Catalog Code:   metFORMIN ; Order Dt/Tm:   10/8/2019 3:57:16 PM CDT          glipiZIDE  :   glipiZIDE ; Status:   Prescribed ; Ordered As Mnemonic:   glipiZIDE 10 mg oral tablet ; Simple Display Line:   1 tab(s), Oral, bid, 180 unknown unit, 0 Refill(s) ; Ordering Provider:   Clare Garcia; Catalog Code:   glipiZIDE ; Order Dt/Tm:   7/31/2019 2:29:25 PM CDT          simvastatin  :   simvastatin ; Status:   Prescribed ; Ordered As Mnemonic:   simvastatin 40 mg oral tablet ; Simple Display Line:   40 mg, 1 tab(s), po, hs, 90 tab(s), 1 Refill(s) ; Ordering Provider:   Clare Garcia; Catalog Code:   simvastatin ; Order Dt/Tm:   1/11/2019 10:34:29 AM CST          Miscellaneous Prescription  :   Miscellaneous Prescription ; Status:   Prescribed ; Ordered As Mnemonic:   TRUETEST BGM STRIPS MG BEAD ; Simple Display Line:   See Instructions, TEST THREE TIMES A DAY TO FOUR TIMES A DAY, 50 unknown unit, 5 Refill(s) ; Ordering Provider:   Adrián Bass MD; Catalog Code:   Miscellaneous Prescription ; Order Dt/Tm:   2/1/2018 2:22:55 PM CST          Miscellaneous Rx Supply  :   Miscellaneous Rx Supply ; Status:   Prescribed ; Ordered As Mnemonic:   True Test BGM Strips MG BEAD ; Simple Display Line:   See Instructions, Test 3-4 times daily, 1 box(es) ; Ordering Provider:   Adrián Bass MD; Catalog Code:   Miscellaneous Rx Supply ; Order Dt/Tm:   4/15/2015 9:05:01 AM CDT            Home Meds    aspirin  :   aspirin ; Status:   Documented ; Ordered  As Mnemonic:   aspirin 325 mg oral tablet ; Simple Display Line:   325 mg, 1 tab(s), po, daily, 0 Refill(s) ; Catalog Code:   aspirin ; Order Dt/Tm:   5/3/2017 5:04:05 PM CDT

## 2022-02-16 NOTE — PROGRESS NOTES
Patient:   TAWANA GILES            MRN: [id number]            FIN: [id number]               Age:   57 years     Sex:  Male     :  1960   Associated Diagnoses:   Diabetes mellitus, type II   Author:   Claer Garcia      Visit Information      Date of Service: 2017 10:00 am  Performing Location: [facility]  Encounter#: [id number]      Primary Care Provider (PCP):  Clare Garcia    NPI# [id number]      Referring Provider:  Clare Garcia NPI# [id number]      Chief Complaint   2017 10:09 AM CDT   3 mo DM check and refills.        History of Present Illness   Confirmed symptoms and concerns with patient as presented in CC above.  He is back to f/u with DM  Did a second trial of Metformin, this time extended release, but he can tolerate no more than1 per day due to GI affects. It keeps him at home and affects his life too much  He continues the glipizide without problems. his A1c decline with the metformin but still can't tolerated  He also has very high deductible and can not afford to see RD  He also can not afford eye exam so hasn't had that done  He tries to control his wt/appetite, but weight is climbing      Review of Systems            Health Status   Allergies:    Nonallergic Reactions (Selected)  Severity Not Documented  MetFORMIN (Diarrhea)   Medications:  (Selected)   Prescriptions  Prescribed  Farxiga 5 mg oral tablet: 1 tab(s) ( 5 mg ), po, daily, Instructions: to replace metformin, # 30 tab(s), 0 Refill(s), Type: Maintenance  True Test BGM Strips MG BEAD: True Test BGM Strips MG BEAD, See Instructions, Instructions: Test 3-4 times daily, Supply, # 1 box(es), 5 Refill(s), Type: Maintenance, Pharmacy: [facility], Test 3-4 times daily  Ventolin HFA 90 mcg/inh inhalation aerosol: 2 puff(s), inh, qid, # 1 EA, 0 Refill(s), Type: Maintenance, Pharmacy: [facility], pt due for OV soon for 3month f/u per Clare Saenz.  betamethasone dipropionate 0.05% topical  cream: 1 matthew, TOP, BID, # 50 gm, 1 Refill(s), Type: Maintenance, Pharmacy: [facility], 1 matthew top bid  glipiZIDE 10 mg oral tablet: 1 tab(s) ( 10 mg ), po, bid, # 28 tab(s), 0 Refill(s), Type: Maintenance, Pharmacy: [facility]  metFORMIN 500 mg oral tablet, extended release: See Instructions, Instructions: 1 tab(s) PO BID, then increase to 3 tabs x1week then 4 tabs at evenin meal., # 120 tab(s), 1 Refill(s), Type: Maintenance, Pharmacy: [facility], 1 tab(s) PO BID, then increase to 3 tabs x1week then 4 tabs at evenin meal....  mometasone 50 mcg/inh nasal spray: 2 spray(s), nasal, daily, # 1 EA, 2 Refill(s), Type: Maintenance, Pharmacy: [facility], 2 spray(s) nasal daily  simvastatin 40 mg oral tablet: 1 tab(s) ( 40 mg ), po, hs, # 90 tab(s), 1 Refill(s), Type: Maintenance, Pharmacy: [facility], 1 tab(s) po hs  triamcinolone 0.5% topical cream: 1 matthew, TOP, BID, Instructions: apply to areas on hands, may substitute more affordable high pot steroid cream, # 20 g, 1 Refill(s), Type: Maintenance, Pharmacy: [facility], 1 matthew top bid,Instr:apply to areas on hands, may substitute more affordable...  Documented Medications  Documented  aspirin 325 mg oral tablet: 1 tab(s) ( 325 mg ), po, daily, 0 Refill(s), Type: Maintenance   Problem list:    All Problems  Diabetes mellitus, type II / SNOMED CT 614029021 / Confirmed  Obesity / ICD-9-.00 / Confirmed  Tubular adenoma of colon / SNOMED CT 4600080559 / Confirmed  Family History of Colon Cancer / ICD-9-CM V16.0 / Confirmed  Canceled: Diabetes mellitus type II.. / ICD-9-.00      Histories   Past Medical History:    Active  Diabetes mellitus, type II (369362200): Onset in the month of 2009 at 49 years  Obesity (278.00)   Family History:    CA - Lung cancer  Mother ()  CA - Cancer  Father ()  Comments:  3/24/2010 11:40 AM - Paulino GONZALEZ, Rossy  kidney ca  CA - Cancer of colon  Father ()  Accident  Sister ()     Procedure  history:    Colonoscopy (SNOMED CT 137007418) performed by Lawrence Loredo MD on 4/22/2015 at 55 Years.  Comments:  4/23/2015 5:15 PM - Lawrence Loredo MD  divertculosis extending to the transverse    4/23/2015 5:06 PM - Lawrence Loredo MD  Indication: Family history  Sedation: MAC  Findings: Tubular adenoma Proximal ascending  Recommendation: Colonoscopy in 5 years  Colonoscopy (SNOMED CT 329102647) in the month of 1/2009 at 48 Years.  Comments:  11/8/2011 6:15 PM - Yocasta Carroll 2014.  Repair of ventral hernia (SNOMED CT 801360229) in the week of 10/11/2002 at 42 Years.  Repair of ventral hernia (SNOMED CT 425206355) in 1988 at 28 Years.  back surgery in 1980 at 20 Years.  Comments:  3/24/2010 11:36 AM - Rossy Bob  ?1980's  Vasectomy (SNOMED CT 45478093).   Social History:        Alcohol Assessment: Current                     Comments:                      11/08/2011 - Yocasta Carroll                     3 to 4 beers once a week.      Tobacco Assessment: Denies Tobacco Use      Substance Abuse Assessment: Denies Substance Abuse      Employment and Education Assessment                     Comments:                      11/08/2011 - Yocasta Carroll                     .      Home and Environment Assessment            Marital status: .                     Comments:                      11/08/2011 - Yocasta Carroll                     Wife - Marie.        Physical Examination   Vital Signs   8/23/2017 10:09 AM CDT Temperature Tympanic 98.1 DegF    Peripheral Pulse Rate 64 bpm    Pulse Site Radial artery    HR Method Manual    Systolic Blood Pressure 132 mmHg    Diastolic Blood Pressure 76 mmHg    Mean Arterial Pressure 95 mmHg    BP Site Right arm    BP Method Manual      Measurements from flowsheet : Measurements   8/23/2017 10:09 AM CDT   Weight Measured - Standard                317 lb     General:  Alert and oriented, No acute distress.    Respiratory:  Lungs are clear to  auscultation.    Cardiovascular:  Normal rate.    Genitourinary:  normal monofilament testing.       Review / Management   Results review:  Lab results: 8/18/2017 9:45 AM CDT    Hgb A1c                   9.4  HI  .         Interpretation: improving A1c but not tolerating metformin.       Impression and Plan   Diagnosis     Diabetes mellitus, type II (IGW47-ZU E11.9).     Course:  Improving.    Patient Instructions:       Counseled: Patient, Regarding diagnosis, Regarding treatment, Regarding medications, Verbalized understanding, consulted Alexandra Moser RD who recommended Farxiga with increased water for better A1c control  [employer] coupon obtained with rx and pt instruected regarding use, check A1c again in Jan or Feb with med check.    Orders     Orders (Selected)   Prescriptions  Prescribed  Farxiga 5 mg oral tablet: 1 tab(s) ( 5 mg ), po, daily, Instructions: to replace metformin, # 30 tab(s), 0 Refill(s), Type: Maintenance  glipiZIDE 10 mg oral tablet: 1 tab(s) ( 10 mg ), po, bid, # 28 tab(s), 0 Refill(s), Type: Maintenance, Pharmacy: [facility]  triamcinolone 0.5% topical cream: 1 matthew, TOP, BID, Instructions: apply to areas on hands, may substitute more affordable high pot steroid cream, # 20 g, 1 Refill(s), Type: Maintenance, Pharmacy: [facility], 1 matthew top bid,Instr:apply to areas on hands, may substitute more affordable....

## 2022-02-16 NOTE — TELEPHONE ENCOUNTER
Entered by Mark Zayas CMA on October 23, 2020 11:54:36 AM CDT  ---------------------  From: Mark Zayas CMA   To: [facility]    Sent: 10/23/2020 11:54:36 AM CDT  Subject: Medication Management     ** Submitted: **  Order:metFORMIN (MetFORMIN (Eqv-Glucophage XR) 500 mg oral tablet, extended release)  2 tab(s)  Oral  bid  Qty:  120 tab(s)        Refills:  0          Substitutions Allowed     Route To [facility]    Signed by Mark Zayas CMA  10/23/2020 4:53:00 PM [facility]    ** Submitted: **  Complete:metFORMIN (metFORMIN 500 mg oral tablet, extended release)   Signed by Mark Zayas CMA  10/23/2020 4:54:00 PM [facility]    ** Not Approved:  **  metFORMIN (METFORMIN    TAB 500MG ER TABLET)  TAKE 2 TABLETS BY MOUTH TWICE DAILY  Qty:  360 unknown unit        Days Supply:  0        Refills:  0          Substitutions Allowed     Route [facility]   Signed by Mark Zayas CMA            ** Patient matched by Mark Zayas CMA on 10/23/2020 11:51:32 AM CDT **      ------------------------------------------  From: Fineline  To: Clare Garcia  Sent: October 23, 2020 11:51:05 AM CDT  Subject: Medication Management  Due: October 8, 2020 2:04:31 PM CDT     ** On Hold Pending Signature **     Dispensed Drug: metFORMIN (MetFORMIN (Eqv-Glucophage XR) 500 mg oral tablet, extended release), TAKE 2 TABLETS BY MOUTH TWICE DAILY  Quantity: 360 unknown unit  Days Supply: 0  Refills: 0  Substitutions Allowed  Notes from Pharmacy:  ------------------------------------------

## 2022-02-16 NOTE — TELEPHONE ENCOUNTER
Entered by Ingrid Calvin LPN on October 14, 2020 7:01:31 AM CDT  Form started - I will leave in your box.      ---------------------  From: Jessie Fields   To: [facility] ([id number]);     Sent: 10/13/2020 4:40:56 PM CDT  Subject: Colonoscopy order      Patients wife called, they received general reminder letter that he is due for Colonoscopy from [facility], looking at chart looks like Clare's patient so sending note here. If okay to have colonoscopy he would like to have ordered. Last visit 10/18/2019---------------------  From: Ingrid Calvin LPN ([employer] ([id number]))   To: Clare Garcia;     Sent: 10/14/2020 7:01:44 AM CDT  Subject: FW: Colonoscopy order---------------------  From: Clare Garcia   To: [facility] ([id number]);     Sent: 10/14/2020 7:37:57 AM CDT  Subject: RE: Colonoscopy order      Glad he is getting this done. Please let him know that because of his DM  and insulin therapy, he will need a visit with [facility] prior to the colonoscopy (he is ASA level 3).  Thanks.sorry, I forgot [facility] is no longer doing Colonoscopies.  Please see if Dr Ferreira will consult with [id number] Spoke with patient and advised he schedule a consult with [id number]. 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 Told him thats fine, and to give us a call once he is ready.

## 2022-02-16 NOTE — PROGRESS NOTES
"   Patient:   TAWANA GILES            MRN: [id number]            FIN: [id number]               Age:   60 years     Sex:  Male     :  1960   Associated Diagnoses:   Diabetes mellitus, type II; Obesity   Author:   Marcella Moser      Visit Information   Visit type:  Diabetes Self Management Education .    Referral source:  Clare Garcia.       Chief Complaint   Uncontrolled DM Type II, Obesity      History of Present Illness   Pt here today for insulin instruction.  Pt would like to keep it to a brief consult due to high deductible plan.    Pt has tried alternate diabetes medications but due to cost or intolerance he would just like to go on insulin and he was actually on insulin with initial dx of diabetes.    Discussed option for [facility] NPH and Regular pens but pt does not want to go to [facility].    Pt would like to try the \"newer\" insulins with savings cards at [facility].      A1C  12% = 297 mg/dL estimated Average Glucose (eAG)    BG in office today 352mg/dL   Metformin - taking as directed will continue with insulin start     [place].  Meals are not at set times.  Meat, potato, vegetable at evening meals some fruit  Exercise: farm work       Health Status   Allergies:    Allergic Reactions (Selected)  Severity Not Documented  Actos (Rash)  Farxiga (Rash)  Nonallergic Reactions (Selected)  Severity Not Documented  MetFORMIN (Diarrhea)   Medications:  (Selected)   Prescriptions  Prescribed  Lantus Solostar Pen 100 units/mL subcutaneous solution: See Instructions, Instructions: starting at 20u and increasing up to 40u as needed max dose   every evening, # 15 mL, 3 Refill(s), Type: Maintenance, Pharmacy: [facility], starting at 20u and increasing up to 40u as needed max dose ; every evening,...  NovoLOG FlexPen 100 units/mL injectable solution: See Instructions, Instructions: take insulin 15 minutes before meals   5u am, 7u noon, 7u evening meal increasing as " directed, # 15 mL, 3 Refill(s), Type: Maintenance, Pharmacy: [facility], take insulin 15 minutes before meals ; 5u am, 7u noon, 7u e...  TRUETEST BGM STRIPS MG BEAD: See Instructions, Instructions: TEST THREE TIMES A DAY TO FOUR TIMES A DAY, # 50 unknown unit, 5 Refill(s), Type: Soft Stop, Pharmacy: [facility], TEST THREE TIMES A DAY TO FOUR TIMES A DAY  True Test BGM Strips MG BEAD: True Test BGM Strips MG BEAD, See Instructions, Instructions: Test 3-4 times daily, Supply, # 1 box(es), 5 Refill(s), Type: Maintenance, Pharmacy: [facility], Test 3-4 times daily  metFORMIN 500 mg oral tablet, extended release: See Instructions, Instructions: TAKE 2 TABLETS BY MOUTH TWICE DAILY, # 360 tab(s), 0 Refill(s), Type: Maintenance, Pharmacy: [facility], Need appt for further refills., TAKE 2 TABLETS BY MOUTH TWICE DAILY  pen needles 31G 6mm: pen needles 31G 6mm, See Instructions, Instructions: use new needle daily with Soliqua, Supply, # 1 box(es), 1 Refill(s), Type: Maintenance, [id number], use new needle daily with Soliqua  simvastatin 40 mg oral tablet: See Instructions, Instructions: TAKE ONE TABLET BY MOUTH AT BEDTIME, # 90 tab(s), 0 Refill(s), Type: Maintenance, Pharmacy: [facility], Need appt for further refills., TAKE ONE TABLET BY MOUTH AT BEDTIME  Documented Medications  Documented  aspirin 325 mg oral tablet: 1 tab(s) ( 325 mg ), po, daily, 0 Refill(s), Type: Maintenance,    Medications          *denotes recorded medication          TRUETEST BGM STRIPS MG BEAD: See Instructions, TEST THREE TIMES A DAY TO FOUR TIMES A DAY, 50 unknown unit, 5 Refill(s).          pen needles 31G 6mm: See Instructions, use new needle daily with Soliqua, 1 box(es), 1 Refill(s).          True Test BGM Strips MG BEAD: See Instructions, Test 3-4 times daily, 1 box(es).          *aspirin 325 mg oral tablet: 325 mg, 1 tab(s), po, daily, 0 Refill(s).          NovoLOG FlexPen 100 units/mL injectable  solution: See Instructions, take insulin 15 minutes before meals   5u am, 7u noon, 7u evening meal increasing as directed, 15 mL, 3 Refill(s).          Lantus Solostar Pen 100 units/mL subcutaneous solution: See Instructions, starting at 20u and increasing up to 40u as needed max dose   every evening, 15 mL, 3 Refill(s).          metFORMIN 500 mg oral tablet, extended release: See Instructions, TAKE 2 TABLETS BY MOUTH TWICE DAILY, 360 tab(s), 0 Refill(s).          simvastatin 40 mg oral tablet: See Instructions, TAKE ONE TABLET BY MOUTH AT BEDTIME, 90 tab(s), 0 Refill(s).       Problem list:    All Problems (Selected)  Diabetes mellitus, type II / SNOMED CT 967152763 / Confirmed  Long term current use of oral hypoglycemic drug / SNOMED CT 101975839 / Confirmed  Family History of Colon Cancer / ICD-9-CM V16.0 / Confirmed  Obesity / ICD-9-.00 / Confirmed  Tubular adenoma of colon / SNOMED CT 2287009872 / Confirmed      Histories   Past Medical History:    Active  Diabetes mellitus, type II (666177102): Onset in the month of 2009 at 49 years  Obesity (278.00)   Family History:    CA - Lung cancer  Mother ()  CA - Cancer  Father ()  Comments:  3/24/2010 11:40 AM CDT - Rossy Bob CMA  kidney ca  CA - Cancer of colon  Father ()  Accident  Sister ()     Procedure history:    Colonoscopy (SNOMED CT 504161339) performed by Lawrence Loredo MD on 2015 at 55 Years.  Comments:  2015 5:15 PM ANU - Lawrence Loredo MD  divertculosis extending to the transverse    2015 5:06 PM Lawrence Engle MD  Indication: Family history  Sedation: MAC  Findings: Tubular adenoma Proximal ascending  Recommendation: Colonoscopy in 5 years  Colonoscopy (SNOMED CT 520523470) in the month of 2009 at 48 Years.  Comments:  2011 6:15 PM NICA - Yocasta Carroll 2014.  Repair of ventral hernia (SNOMED CT 640203786) in the week of 10/11/2002 at 42 Years.  Repair of ventral hernia  (SNOMED CT 780352629) in 1988 at 28 Years.  back surgery in 1980 at 20 Years.  Comments:  3/24/2010 11:36 AM CDT - Rossy Bob  ?1980's  Vasectomy (SNOMED CT 98439110).   Social History:        Alcohol Assessment: Current                     Comments:                      11/08/2011 - Yocasta Carroll                     3 to 4 beers once a week.      Tobacco Assessment: Denies Tobacco Use      Substance Abuse Assessment: Denies Substance Abuse      Employment and Education Assessment                     Comments:                      11/08/2011 - Yocasta Carroll                     .      Home and Environment Assessment            Marital status: .                     Comments:                      11/08/2011 - Yocasta Carroll                     Wife - Marie.        Physical Examination   Vital Signs   6/16/2020 9:31 AM CDT Temperature Tympanic 97.2 DegF  LOW    Peripheral Pulse Rate 65 bpm    Systolic Blood Pressure 130 mmHg    Diastolic Blood Pressure 66 mmHg    Mean Arterial Pressure 87 mmHg    BP Site Right arm    BP Method Manual    Oxygen Saturation 95 %      Measurements from flowsheet : Measurements   6/16/2020 9:31 AM CDT Height Measured - Standard 69 in    Weight Measured - Standard 293 lb    BSA 2.54 m2    Body Mass Index 43.26 kg/m2  HI         Review / Management   Results review:  Lab results   2/24/2020 9:53 AM CST Hgb A1c 12.0  HI   10/1/2019 8:50 AM CDT Sodium Level 133 mmol/L  LOW    Potassium Level 4.2 mmol/L    Chloride Level 99 mmol/L    CO2 Level 25 mmol/L    Glucose Level 283 mg/dL  HI    BUN 19 mg/dL    Creatinine Level 0.88 mg/dL    BUN/Creat Ratio NOT APPLICABLE    eGFR 95 mL/min/1.73m2    eGFR African American 111 mL/min/1.73m2    Calcium Level 9.6 mg/dL    Hgb A1c 12.0  HI    Cholesterol 136 mg/dL    Non-HDL Cholesterol 100    HDL 36 mg/dL  LOW    Cholesterol/HDL Ratio 3.8    LDL 69    Triglyceride 223 mg/dL  HI    U Microalbumin 0.7 mg/dL    Ur Creatinine 137 mg/dL     Ur Microalbumin/Creatinine Ratio 5   1/4/2019 10:05 AM CST Hgb A1c 10.1  HI   .       Impression and Plan   Diagnosis     Diabetes mellitus, type II (TWB04-GN E11.9).     Obesity (RVE23-LM E66.01).       Counseled: Patient, [place] Self Care Behaviors   Healthy Eating - Discussed small dietary changes that pt is willing to incorporate into diet, reviewed basic carb guidelines to keep a consistent intake as pt will be taking set dosing of insulin.  Weight loss tips with mindful eating.  Education review on decreasing cardiovascular risk with following Therapeutic Lifestyle Changes (TLC) nutrition plan for heart healthy eating.  Being Active - Education on how exercise helps with :    - lowering BG by increasing the muscles ability to take up and use glucose   - weight loss   - healthier heart (improve lipid profile)   - improve sleep, mood, energy   - decrease stress      Monitoring - Increase testing to TID ac and as able 2 hours after meals   Taking Medication - on Metformin   Pt is instructed on Lantus and Novolog insulin via pens; proper use, dosing, injection sites, and s/sx of hypoglycemia and appropriate treatment protocol.  Pt is shown a demonstration of the use of the pen and was able to return demonstration without difficulty.  Patient was provided with further written materials for references.  Problem Solving - medical support team assistance, resources provided (written and apps, internet); good control of stress  Healthy Coping - support of friends, family, and medical support team   Reducing Risks - Will discuss at f/u apts.  Weight loss goal of 7 - 10% of current body weight pounds as a reasonable goal to help increase insulin sensitivity   .      Goals:   1.  Lantus 20u and continue to increase by 1 unit every 3-4 days until am in goal range   Novolog 5 breakfast, 7 noon meal, 7u evening meal  2.  Pt to monitor BG TID ac and 2 hrs after some meals as able.    BG goals: Fasting and before meals 80 - 130  mg/dL, 2 hrs after the start of a meal 80 - 140 mg/dL.        Professional Services   Time spent with pt 30 min   cc Clare CHAMBERS

## 2022-02-16 NOTE — LETTER
(Inserted Image. Unable to display)   January 07, 2019      TAWANA GILES      [address] [place], [place] [id number]        Dear TAWANA,    Thank you for selecting [facility] for your healthcare needs.  Below you will find the results of the recent tests done at our clinic.     Cal Wilson,  I am afraid the A1C is climbing.  Please come in and see me and we can explore some other options, thank you.      Result Name Current Result Previous Result Reference Range   Hgb A1c ((H)) 10.1 1/4/2019 ((H)) 9.0 4/23/2018  - <5.7       Please contact me or my assistant at [phone] if you have any questions or concerns.     Sincerely,        ANGUS Gardner-NP  Family Nurse Practitioner      What do your labs mean?  Below is a glossary of commonly ordered labs:  LDL   Bad Cholesterol   HDL   Good Cholesterol  AST/ALT   Liver Function   Cr/Creatinine   Kidney Function  Microalbumin   Kidney Function  BUN   Kidney Function  PSA   Prostate    TSH   Thyroid Hormone  HgbA1c   Diabetes Test   Hgb (Hemoglobin)   Red Blood Cells  WBC   White Blood Cell Count

## 2022-02-16 NOTE — NURSING NOTE
Pt appears on [facility] chronic disease list for elevated A1C.  Patient due now for CDM/labs. RTC placed and letter sent 8/1/19

## 2022-02-16 NOTE — PROGRESS NOTES
Patient:   TAWANA GILES            MRN: [id number]            FIN: [id number]               Age:   60 years     Sex:  Male     :  1960   Associated Diagnoses:   Pre-op exam; Diabetes mellitus, type II; Encounter for immunization; Obesity; Cataract of both eyes   Author:   Clare Garcia      Preoperative Information   Here for preop history and physical      Chief Complaint   2020 9:29 AM CST   pre-op exam, cataract surgery right eye scheduled 2021 and left 21 with Dr Licona at [facility], unsure if he is having COVID testing pre-operatively     It has been over a year since I have seen Steve. 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 Insurance has also been a barrier to his care in the past.   He needs clearance for two cataract surgeries    He also is well overdue for his DM check  His A1C remains above 8 but has improved significantly since he started insulin (other SGLT2 inhibitors and GLP1 agonists were not affordable or caused side effects. His chart is labeled intolerance to Metformin but he has restarted that in the last couple years and for the most part his Gi system has tolerated it. He has followed recently with Alexandra Gandhi RD. He denies neuropathy and declines monofilament testing. He states he checks blood sugars at home and does not have low blood sugars even with the addition this fall of Glipizide. He confirms he takes a baby aspirin daily and the statin medication about noon each day by choice.      Review of Systems   Constitutional:  Negative.    Weight has been stable, no nutritional concerns  No Allergies to latex, iodine, contrast dye, shell fish or local anesthetics   Eye:  Recent visual problem.    Ear/Nose/Mouth/Throat:  Negative.    Respiratory:  he c/o chronic cough for past 2-3 years. He did have pneumonia in 2016, states it doesn't feel like that. He has tried intermit antihistamine use as it feels like it might be nasal drainage. VARSHA veloz is a non smoker.  Declines offer of CXR today .    No history of sleep apnea   Gastrointestinal:  Negative.    Genitourinary:  Negative.    Hematology/Lymphatics:  Negative.    Immunologic:  Negative.    Musculoskeletal:  Negative.    Integumentary:  Negative.    Neurologic:  Negative.    Psychiatric:  Negative.    All other systems reviewed and negative   Has no history of anemia.  Has  no history of DVT or pulmonary embolism.  Has  no personal history of bleeding problems.   Has  no personal or family history of anesthesia reactions.  Patient does not have active tuberculosis.    S/he  has taken daily aspirin    S/he  has not taken Plavix (Clopidogrel) in the last 2 weeks.    S/he  has not taken warfarin in the past week.    S/he  has not been on corticosteroids for more than 2 weeks recently.      S/he  is not DNR before, during or after surgery.    Chest pain / SOB walking up 2 flights of steps? No  Pain in neck or jaw? No  CAD MI?  NO  Afib? NO  Heart Failure? No  Asthma  or Bronchitis? NO       Seizure Disorder? No  CKD? No  Thyroid Disease? No  Liver Disease No  CVA? No           Health Status   Allergies:    Allergic Reactions (Selected)  Severity Not Documented  Actos (Rash)  Farxiga (Rash)  Nonallergic Reactions (Selected)  Severity Not Documented  MetFORMIN (Diarrhea)   Problem list:    All Problems  Diabetes mellitus, type II / SNOMED CT 028060390 / Confirmed  Long term current use of oral hypoglycemic drug / SNOMED CT 395608833 / Confirmed  Family History of Colon Cancer / ICD-9-CM V16.0 / Confirmed  Obesity / ICD-9-.00 / Confirmed  Tubular adenoma of colon / SNOMED CT 8618263834 / Confirmed  Canceled: Diabetes mellitus type II.. / ICD-9-.00   Medications:  (Selected)   Prescriptions  Prescribed  Lantus Solostar Pen 100 units/mL subcutaneous solution: See Instructions, Instructions: starting at 20u and increasing up to 40u as needed max dose   every evening, # 15 mL, 3 Refill(s), Type: Maintenance, Pharmacy:  [facility], starting at 20u and increasing up to 40u as needed max dose ; every evening,...  MetFORMIN (Eqv-Glucophage XR) 500 mg oral tablet, extended release: = 2 tab(s) ( 1,000 mg ), Oral, bid, # 360 tab(s), 1 Refill(s), Type: Maintenance, Pharmacy: [facility], Pt due for office visit for further refills, 2 tab(s) Oral bid, 69, in, 10/12/20 9:48:00 CDT, Height Measured, 293, lb, 06/16/20 9:31:00 CDT, John...  NovoLOG FlexPen 100 units/mL injectable solution: See Instructions, Instructions: take insulin 15 minutes before meals   5u am, 7u noon, 7u evening meal increasing as directed, # 15 mL, 3 Refill(s), Type: Maintenance, Pharmacy: [facility], take insulin 15 minutes before meals ; 5u am, 7u noon, 7u e...  TRUETEST BGM STRIPS MG BEAD: See Instructions, Instructions: TEST THREE TIMES A DAY TO FOUR TIMES A DAY, # 50 unknown unit, 5 Refill(s), Type: Soft Stop, Pharmacy: [facility], TEST THREE TIMES A DAY TO FOUR TIMES A DAY  True Test BGM Strips MG BEAD: True Test BGM Strips MG BEAD, See Instructions, Instructions: Test 3-4 times daily, Supply, # 1 box(es), 5 Refill(s), Type: Maintenance, Pharmacy: [facility], Test 3-4 times daily  Ventolin HFA 90 mcg/inh inhalation aerosol: 2 puff(s), Inhale, qid, # 1 EA, 1 Refill(s), Type: Maintenance, Pharmacy: [facility], 2 puff(s) Inhale qid, 69, in, 06/16/20 9:31:00 CDT, Height Measured, 293, lb, 06/16/20 9:31:00 CDT, Weight Measured  glipiZIDE 10 mg oral tablet: See Instructions, Instructions: TAKE ONE TABLET BY MOUTH TWICE DAILY, # 180 tab(s), 2 Refill(s), Type: Maintenance, Pharmacy: [facility], Need appt for further refills., TAKE ONE TABLET BY MOUTH TWICE DAILY, 69, in, 10/12/20 9:48:00 CDT, Height Nell...  pen needles 31G 6mm: pen needles 31G 6mm, See Instructions, Instructions: use new needle daily with Soliqua, Supply, # 1 box(es), 1 Refill(s), Type: Maintenance, Pharmacy: [id number], use new needle daily with Soliqua  simvastatin 40 mg  oral tablet: See Instructions, Instructions: TAKE ONE TABLET BY MOUTH AT BEDTIME, # 90 EA, 3 Refill(s), Type: Maintenance, Pharmacy: [facility], TAKE ONE TABLET BY MOUTH AT BEDTIME, 69, in, 20 9:29:00 CST, Height Measured, 312.5, lb, 20 9:29:00 CST,...  Documented Medications  Documented  aspirin 325 mg oral tablet: 1 tab(s) ( 325 mg ), po, daily, 0 Refill(s), Type: Maintenance      Histories   Past Medical History:    Active  Diabetes mellitus, type II (657707889): Onset in the month of 2009 at 49 years  Obesity (278.00)   Family History:    CA - Lung cancer  Mother ()  CA - Cancer  Father ()  Comments:  3/24/2010 11:40 AM CDT - Rossy Bob CMA  kidney ca  CA - Cancer of colon  Father ()  Accident  Sister ()     Procedure history:    Colonoscopy (SNOMED CT 165307216) performed by Lawrence Loredo MD on 2015 at 55 Years.  Comments:  2015 5:15 PM ANU - Lawrence Loredo MD  divertculosis extending to the transverse    2015 5:06 PM EPHRAIMT Lawrence Crowley MD  Indication: Family history  Sedation: MAC  Findings: Tubular adenoma Proximal ascending  Recommendation: Colonoscopy in 5 years  Colonoscopy (SNOMED CT 221314724) in the month of 2009 at 48 Years.  Comments:  2011 6:15 PM CST - Yocasta Carroll 2014.  Repair of ventral hernia (SNOMED CT 200762919) in the week of 10/11/2002 at 42 Years.  Repair of ventral hernia (SNOMED CT 480913649) in  at 28 Years.  back surgery in  at 20 Years.  Comments:  3/24/2010 11:36 AM EPHRAIMT - Rossy Bob  ?s  Vasectomy (SNOMED CT 20850153).   Social History:        Electronic Cigarette/Vaping Assessment            Electronic Cigarette Use: Never.      Alcohol Assessment: Current                     Comments:                      2011 - Yocasta Carroll                     3 to 4 beers once a week.      Tobacco Assessment            Never (less than 100 in lifetime)      Substance Abuse  Assessment: Denies Substance Abuse      Employment and Education Assessment                     Comments:                      11/08/2011 - Yocasta Carroll.      Home and Environment Assessment            Marital status: .                     Comments:                      11/08/2011 - Yocasta Carroll - Marie.  ,        Electronic Cigarette/Vaping Assessment            Electronic Cigarette Use: Never.      Alcohol Assessment: Current                     Comments:                      11/08/2011 - Yocasta Carroll                     3 to 4 beers once a week.      Tobacco Assessment            Never (less than 100 in lifetime)      Substance Abuse Assessment: Denies Substance Abuse      Employment and Education Assessment                     Comments:                      11/08/2011 - Yocasta Carroll.      Home and Environment Assessment            Marital status: .                     Comments:                      11/08/2011 - Yocasta Carroll.        Physical Examination   Vital Signs   12/23/2020 9:29 AM CST Temperature Tympanic 97.7 DegF  LOW    Peripheral Pulse Rate 71 bpm    Systolic Blood Pressure 130 mmHg    Diastolic Blood Pressure 72 mmHg    Mean Arterial Pressure 91 mmHg    BP Site Right arm    BP Method Manual    Oxygen Saturation 97 %      Measurements from flowsheet : Measurements   12/23/2020 9:29 AM CST Height Measured - Standard 69 in    Weight Measured - Standard 312.5 lb    BSA 2.62 m2    Body Mass Index 46.14 kg/m2  HI      General:  Alert and oriented, No acute distress.    Eye:  Normal conjunctiva.    HENT:  Normocephalic, Tympanic membranes are clear, Oral mucosa is moist, No pharyngeal erythema, Mallampati Score 3.    Neck:  Supple, Non-tender, No carotid bruit, No lymphadenopathy, No thyromegaly.    Respiratory:  Breath sounds are equal, Symmetrical chest wall  expansion.         Respirations: Are within normal limits.         Pattern: Regular.         Breath sounds: Bilateral, Within normal limits.    Cardiovascular:  Normal rate, Regular rhythm, No murmur, Good pulses equal in all extremities, Normal peripheral perfusion, No edema.    Gastrointestinal:  Soft, Non-tender, Non-distended.    Musculoskeletal:  Normal range of motion, Normal strength, Normal gait.    Integumentary:  Warm, Dry, Intact, No rash.    Neurologic:  Alert, Oriented, Normal motor function, No focal deficits.    Psychiatric:  Cooperative, Appropriate mood & affect.       Review / Management   Results review:  Lab results   10/12/2020 9:03 AM CDT Sodium Level 138 mmol/L    Potassium Level 4.4 mmol/L    Chloride Level 103 mmol/L    CO2 Level 28 mmol/L    Glucose Level 209 mg/dL  HI    BUN 17 mg/dL    Creatinine Level 0.85 mg/dL    BUN/Creat Ratio NOT APPLICABLE    eGFR 96 mL/min/1.73m2    eGFR African American 111 mL/min/1.73m2    Calcium Level 9.3 mg/dL    Hgb A1c 8.4  HI    Cholesterol 149 mg/dL    Non-HDL Cholesterol 106    HDL 43 mg/dL    Cholesterol/HDL Ratio 3.5    LDL 81    Triglyceride 145 mg/dL    U Creatinine 91 mg/dL    U Microalbumin 0.3 mg/dL    Ur Microalbumin/Creatinine Ratio 3   .       Impression and Plan   Diagnosis     Pre-op exam (ACO03-OL Z01.818).     Diabetes mellitus, type II (GZT56-IS E11.9).     Encounter for immunization (JEJ62-YO Z23).     Obesity (TZX29-QQ E66.01).     Cataract of both eyes (HSP54-GS H26.9).     Condition:  Stable,  Stable, no contraindication for general anesthesia or surgical procedure.. .    Orders     No SBE prophylaxis or DVT prophylaxis necessary  he was advised to hold his morning insulin, metformin and glipizide.     He is to have A1c rechecked in mid Jan. I did him brochure of direct access lab options if that is more affordable for him  vaccines update today.     Orders     Orders (Selected)   Outpatient Orders  Completed  Adacel (Tdap): 0.5 mL,  im, once  Prevnar 13: 0.5 mL, im, once  Prescriptions  Prescribed  Lantus Solostar Pen 100 units/mL subcutaneous solution: See Instructions, Instructions: starting at 20u and increasing up to 40u as needed max dose   every evening, # 15 mL, 3 Refill(s), Type: Maintenance, Pharmacy: [facility], starting at 20u and increasing up to 40u as needed max dose ; every evening,...  MetFORMIN (Eqv-Glucophage XR) 500 mg oral tablet, extended release: = 2 tab(s) ( 1,000 mg ), Oral, bid, # 360 tab(s), 1 Refill(s), Type: Maintenance, Pharmacy: [facility], Pt due for office visit for further refills, 2 tab(s) Oral bid, 69, in, 10/12/20 9:48:00 CDT, Height Measured, 293, lb, 06/16/20 9:31:00 CDT, John...  NovoLOG FlexPen 100 units/mL injectable solution: See Instructions, Instructions: take insulin 15 minutes before meals   5u am, 7u noon, 7u evening meal increasing as directed, # 15 mL, 3 Refill(s), Type: Maintenance, Pharmacy: [facility], take insulin 15 minutes before meals ; 5u am, 7u noon, 7u e...  glipiZIDE 10 mg oral tablet: See Instructions, Instructions: TAKE ONE TABLET BY MOUTH TWICE DAILY, # 180 tab(s), 2 Refill(s), Type: Maintenance, Pharmacy: [facility], Need appt for further refills., TAKE ONE TABLET BY MOUTH TWICE DAILY, 69, in, 10/12/20 9:48:00 CDT, Height Nell...  simvastatin 40 mg oral tablet: See Instructions, Instructions: TAKE ONE TABLET BY MOUTH AT BEDTIME, # 90 EA, 3 Refill(s), Type: Maintenance, Pharmacy: [facility], TAKE ONE TABLET BY MOUTH AT BEDTIME, 69, in, 12/23/20 9:29:00 CST, Height Measured, 312.5, lb, 12/23/20 9:29:00 CST,...  Documented Medications  Documented  aspirin 325 mg oral tablet: 1 tab(s) ( 325 mg ), po, daily, 0 Refill(s), Type: Maintenance.

## 2022-02-16 NOTE — NURSING NOTE
Comprehensive Intake Entered On:  12/23/2020 9:36 AM CST    Performed On:  12/23/2020 9:29 AM CST by Ingrid Calvin LPN               Summary   Chief Complaint :   pre-op exam, cataract surgery right eye scheduled 1/5/2021 and left 1/19/21 with Dr Licona at [facility], unsure if he is having COVID testing pre-operatively   Weight Measured :   312.5 lb(Converted to: 312 lb 8 oz, 141.748 kg)    Height Measured :   69 in(Converted to: 5 ft 9 in, 175.26 cm)    Body Mass Index :   46.14 kg/m2 (HI)    Body Surface Area :   2.62 m2   Systolic Blood Pressure :   130 mmHg   Diastolic Blood Pressure :   72 mmHg   Mean Arterial Pressure :   91 mmHg   Peripheral Pulse Rate :   71 bpm   BP Site :   Right arm   BP Method :   Manual   Temperature Tympanic :   97.7 DegF(Converted to: 36.5 DegC)  (LOW)    Oxygen Saturation :   97 %   Ingrid Calvin LPN - 12/23/2020 9:29 AM CST   Health Status   Allergies Verified? :   Yes   Medication History Verified? :   Yes   Medical History Verified? :   No   Pre-Visit Planning Status :   Completed   Tobacco Use? :   Never smoker   Ingrid aClvin LPN - 12/23/2020 9:29 AM CST   Meds / Allergies   (As Of: 12/23/2020 9:36:18 AM CST)   Allergies (Active)   Actos  Estimated Onset Date:   Unspecified ; Reactions:   rash ; Created By:   Clare Garcia; Reaction Status:   Active ; Category:   Drug ; Substance:   Actos ; Type:   Allergy ; Updated By:   Clare Garcia; Reviewed Date:   6/17/2020 4:00 PM CDT      Farxiga  Estimated Onset Date:   Unspecified ; Reactions:   rash ; Created By:   Myra Sigala; Reaction Status:   Active ; Category:   Drug ; Substance:   Farxiga ; Type:   Allergy ; Updated By:   Myra Sigala; Reviewed Date:   6/17/2020 4:00 PM CDT      metFORMIN  Estimated Onset Date:   Unspecified ; Reactions:   diarrhea ; Created By:   Vannesa Mcgowan MA; Reaction Status:   Active ; Category:   Drug ; Substance:   metFORMIN ; Type:   Intolerance ; Updated By:    Vannesa Mcgowan MA; Source:   Patient ; Reviewed Date:   6/17/2020 4:00 PM CDT        Medication List   (As Of: 12/23/2020 9:36:18 AM CST)   Prescription/Discharge Order    metFORMIN  :   metFORMIN ; Status:   Prescribed ; Ordered As Mnemonic:   MetFORMIN (Eqv-Glucophage XR) 500 mg oral tablet, extended release ; Simple Display Line:   1,000 mg, 2 tab(s), Oral, bid, 360 tab(s), 1 Refill(s) ; Ordering Provider:   Clare Garcia; Catalog Code:   metFORMIN ; Order Dt/Tm:   11/2/2020 5:11:55 PM CST          albuterol  :   albuterol ; Status:   Prescribed ; Ordered As Mnemonic:   Ventolin HFA 90 mcg/inh inhalation aerosol ; Simple Display Line:   2 puff(s), Inhale, qid, 1 EA, 1 Refill(s) ; Ordering Provider:   Clare Garcia; Catalog Code:   albuterol ; Order Dt/Tm:   9/1/2020 11:14:01 AM CDT          Miscellaneous Prescription  :   Miscellaneous Prescription ; Status:   Prescribed ; Ordered As Mnemonic:   pen needles 31G 6mm ; Simple Display Line:   See Instructions, use new needle daily with Soliqua, 1 box(es), 1 Refill(s) ; Ordering Provider:   Clare Garcia; Catalog Code:   Miscellaneous Prescription ; Order Dt/Tm:   12/17/2019 2:18:08 PM CST          insulin glargine  :   insulin glargine ; Status:   Prescribed ; Ordered As Mnemonic:   Lantus Solostar Pen 100 units/mL subcutaneous solution ; Simple Display Line:   See Instructions, starting at 20u and increasing up to 40u as needed max dose   every evening, 15 mL, 3 Refill(s) ; Ordering Provider:   Clare Garcia; Catalog Code:   insulin glargine ; Order Dt/Tm:   6/16/2020 11:11:19 AM CDT          insulin aspart  :   insulin aspart ; Status:   Prescribed ; Ordered As Mnemonic:   NovoLOG FlexPen 100 units/mL injectable solution ; Simple Display Line:   See Instructions, take insulin 15 minutes before meals   5u am, 7u noon, 7u evening meal increasing as directed, 15 mL, 3 Refill(s) ; Ordering Provider:   Clare Garcia; Catalog Code:   insulin  aspart ; Order Dt/Tm:   6/16/2020 11:22:14 AM CDT          simvastatin  :   simvastatin ; Status:   Prescribed ; Ordered As Mnemonic:   simvastatin 40 mg oral tablet ; Simple Display Line:   See Instructions, TAKE ONE TABLET BY MOUTH AT BEDTIME, 90 tab(s), 0 Refill(s) ; Ordering Provider:   Clare Garcia; Catalog Code:   simvastatin ; Order Dt/Tm:   4/22/2020 8:33:08 AM CDT          glipiZIDE  :   glipiZIDE ; Status:   Prescribed ; Ordered As Mnemonic:   glipiZIDE 10 mg oral tablet ; Simple Display Line:   See Instructions, TAKE ONE TABLET BY MOUTH TWICE DAILY, 180 tab(s), 2 Refill(s) ; Ordering Provider:   Clare Garcia; Catalog Code:   glipiZIDE ; Order Dt/Tm:   11/2/2020 5:11:12 PM CST          Miscellaneous Prescription  :   Miscellaneous Prescription ; Status:   Prescribed ; Ordered As Mnemonic:   TRUETEST BGM STRIPS MG BEAD ; Simple Display Line:   See Instructions, TEST THREE TIMES A DAY TO FOUR TIMES A DAY, 50 unknown unit, 5 Refill(s) ; Ordering Provider:   Adrián Bass MD; Catalog Code:   Miscellaneous Prescription ; Order Dt/Tm:   2/1/2018 2:22:55 PM CST          Miscellaneous Rx Supply  :   Miscellaneous Rx Supply ; Status:   Prescribed ; Ordered As Mnemonic:   True Test BGM Strips MG BEAD ; Simple Display Line:   See Instructions, Test 3-4 times daily, 1 box(es) ; Ordering Provider:   Adrián Bass MD; Catalog Code:   Miscellaneous Rx Supply ; Order Dt/Tm:   4/15/2015 9:05:01 AM CDT            Home Meds    aspirin  :   aspirin ; Status:   Documented ; Ordered As Mnemonic:   aspirin 325 mg oral tablet ; Simple Display Line:   325 mg, 1 tab(s), po, daily, 0 Refill(s) ; Catalog Code:   aspirin ; Order Dt/Tm:   5/3/2017 5:04:05 PM CDT            ID Risk Screen   Recent Travel History :   No recent travel   Family Member Travel History :   No recent travel   Other Exposure to Infectious Disease :   Unknown   Ingrid Calvin LPN - 12/23/2020 9:29 AM CST   Social History   Social  History   (As Of: 12/23/2020 9:36:18 AM CST)   Alcohol:  Current       Comments:  11/8/2011 6:20 PM - Yocasta Carroll: 3 to 4 beers once a week.   (Last Updated: 11/8/2011 6:20:12 PM CST by Yocasta Carroll)          Tobacco:        Never (less than 100 in lifetime)   (Last Updated: 12/23/2020 9:33:31 AM CST by Ingrid Calvin LPN)          Electronic Cigarette/Vaping:        Electronic Cigarette Use: Never.   (Last Updated: 12/23/2020 9:33:34 AM CST by Ingrid Calvin LPN)          Substance Abuse:  Denies Substance Abuse      (Last Updated: 11/8/2011 6:19:59 PM CST by Yocasta Carroll )         Employment/School:         Comments:  11/8/2011 6:19 PM - Yocasta Carroll: .   (Last Updated: 11/8/2011 6:19:37 PM CST by Yocasta Carroll)          Home/Environment:        Marital status: .   Comments:  11/8/2011 6:19 PM - Yocasta Carroll: Wife - Marie.   (Last Updated: 11/8/2011 6:19:46 PM CST by Yocasta Carroll)

## 2022-02-16 NOTE — NURSING NOTE
Vital Signs Entered On:  1/4/2019 10:41 AM CST    Performed On:  1/4/2019 10:41 AM CST by Leela Kumar RN               Vital Signs   Systolic Blood Pressure :   122 mmHg   Diastolic Blood Pressure :   70 mmHg   Mean Arterial Pressure :   87 mmHg   Leela Kumar RN - 1/4/2019 10:41 AM CST

## 2022-02-16 NOTE — PROGRESS NOTES
Chief Complaint        pt here for DM check      History of Present Illness           8/23/2017           He is back to f/u with DM            Did a second trial of Metformin, this time extended release, but he can tolerate no more than1 per day due to GI affects. It keeps him at home and affects his life too much            He continues the glipizide without problems. his A1c decline with the metformin but still can't tolerated            He also has very high deductible and can not afford to see RD            He also can not afford eye exam so hasn't had that done            He tries to control his wt/appetite, but weight is climbing              [1]          6/27/2018          Here for DM check          he did not tolerated the Farxiga--got a rash          He is back to taking 2 tabs metformin daily (has GI issues and barely tolerates but wants to continue) and 2 glipizide per day.          he checks his sugars rarely, seems to be in the 140's          he has lost some wt, trying to eat healthier          Denies any neuropathy in feed          Take aspirin daily, feels really well and feels he is eating better          Able to clean the barn and [place] with no chest pain or SOB      Physical Exam       Vitals & Measurements        HR: 58(Peripheral)  BP: 120/76  SpO2: 98%         HT: 69 in  WT: 312.8 lb  BMI: 46.19           Alert and oriented.           Skin warm, pink, dry, no rash          No cervical lymphadenopathy anteriorly or posteriorly          No nuchal rigidity          Tm's clear bilat          Posterior orophyarnx without injection, no exudate          No nasal discharge          Heart with regular rate rhythm          Lungs clear bilat          see April labs                 Assessment/Plan           Atopic dermatitis (L20.9)              cream is effective, refilled           Diabetes mellitus, type II (E11.9)              supported his efforts, will do A1c in 1 month  through [facility] lab and give me results           Obesity (E66.01)           Orders:             glipiZIDE, See Instructions, Instructions: TAKE ONE TABLET BY MOUTH TWICE DAILY, # 180 tab(s), 1 Refill(s), Type: Soft Stop, Pharmacy: [facility], TAKE ONE TABLET BY MOUTH TWICE DAILY, (Ordered)             metFORMIN, 1 tab(s) ( 500 mg ), PO, BID, # 180 tab(s), 1 Refill(s), Type: Maintenance, Pharmacy: [facility], 1 tab(s) po bid, (Ordered)             simvastatin, 1 tab(s) ( 40 mg ), po, hs, # 90 tab(s), 1 Refill(s), Type: Maintenance, Pharmacy: [facility], Pt due for office visit., 1 tab(s) po hs, (Ordered)             triamcinolone topical, 1 matthew, TOP, BID, Instructions: apply to areas on hands, may substitute more affordable high pot steroid cream, # 20 g, 1 Refill(s), Type: Maintenance, Pharmacy: [facility], 1 matthew top bid,Instr:apply to areas on hands, may substitute more affordable..., (Ordered)      Patient Information         Name:TAWANA GILSE          Address:          [address] [place] [id number]         Sex:Male         YOB: 1960         [phone]         MRN:[id number]         FIN:[id number]         Location:[facility]         Date of Service:06/27/2018          Primary Care Physician:           Clare Garcia, [phone]          Attending Physician:           Clare Garcia, [phone]      Problem List/Past Medical History        Ongoing         Diabetes mellitus, type II         Family History of Colon Cancer         Obesity         Tubular adenoma of colon        Historical         No qualifying data      Procedure/Surgical History         Colonoscopy (04/22/2015)                    Comments:          Indication: Family history          Sedation: MAC          Findings: Tubular adenoma Proximal ascending          Recommendation: Colonoscopy in 5 years          divertculosis extending to the transverse         Colonoscopy  (01/2009)                    Comments:          Recheck 2014.         Repair of ventral hernia (Week of 10/11/2002)                   Repair of ventral hernia (1988)                   back surgery (1980)                    Comments:          ?1980's         Vasectomy                Medications         mometasone 50 mcg/inh nasal spray: 2 spray(s), nasal, daily, 1 EA.         True Test BGM Strips MG BEAD: See Instructions, Test 3-4 times daily, 1 box(es).         aspirin 325 mg oral tablet: 325 mg, 1 tab(s), po, daily, 0 Refill(s).         Ventolin HFA 90 mcg/inh inhalation aerosol: 2 puff(s), inh, qid, 1 EA, 0 Refill(s).         TRUETEST BGM STRIPS MG BEAD: See Instructions, TEST THREE TIMES A DAY TO FOUR TIMES A DAY, 50 unknown unit, 5 Refill(s).         glipiZIDE 10 mg oral tablet: See Instructions, TAKE ONE TABLET BY MOUTH TWICE DAILY, 180 tab(s), 1 Refill(s).         metFORMIN 500 mg oral tablet: 500 mg, 1 tab(s), PO, BID, 180 tab(s), 1 Refill(s).         simvastatin 40 mg oral tablet: 40 mg, 1 tab(s), po, hs, 90 tab(s), 1 Refill(s).         triamcinolone 0.5% topical cream: 1 matthew, TOP, BID, apply to areas on hands, may substitute more affordable high pot steroid cream, 20 g, 1 Refill(s).                      Allergies        Farxiga (rash)        metFORMIN (diarrhea)      Social History        Smoking Status - 06/27/2018         Never smoker         Alcohol - Current, 11/08/2011         Employment and Education         Home and Environment          Marital status: ., 11/08/2011         Substance Abuse - Denies Substance Abuse, 11/08/2011         Tobacco - Denies Tobacco Use, 03/24/2010      Family History        Accident: Sister.        CA - Cancer: Father.        CA - Cancer of colon: Father.        CA - Lung cancer: Mother.      Immunizations          Vaccine Date Status          tetanus/diphth/pertuss (Tdap) adult/adol 11/10/2011 Given          influenza virus vaccine, inactivated 11/10/2011 Given       Lab Results           Lab Results (Last 4 results within 90 days)            Sodium Level: 136 mmol/L [135 mmol/L - 146 mmol/L] (04/23/18 09:30:00 CDT)           Potassium Level: 4.3 mmol/L [3.5 mmol/L - 5.3 mmol/L] (04/23/18 09:30:00 CDT)           Chloride Level: 105 mmol/L [98 mmol/L - 110 mmol/L] (04/23/18 09:30:00 CDT)           CO2 Level: 24 mmol/L [20 mmol/L - 31 mmol/L] (04/23/18 09:30:00 CDT)           Glucose Level: 206 mg/dL High [65 mg/dL - 99 mg/dL] (04/23/18 09:30:00 CDT)           BUN: 18 mg/dL [7 mg/dL - 25 mg/dL] (04/23/18 09:30:00 CDT)           Creatinine Level: 0.81 mg/dL [0.7 mg/dL - 1.33 mg/dL] (04/23/18 09:30:00 CDT)           BUN/Creat Ratio: NOT APPLICABLE [6  - 22] (04/23/18 09:30:00 CDT)           eGFR: 99 mL/min/1.73m2 [8.6 mg/dL - 10.3 mg/dL] (04/23/18 09:30:00 CDT)           eGFR African American: 115 mL/min/1.73m2 [6  - 22] (04/23/18 09:30:00 CDT)           Calcium Level: 9.2 mg/dL [8.6 mg/dL - 10.3 mg/dL] (04/23/18 09:30:00 CDT)           Hgb A1c: 9 High [65 mg/dL - 99 mg/dL] (04/23/18 09:30:00 CDT)           Cholesterol: 134 mg/dL [8.6 mg/dL - 10.3 mg/dL] (04/23/18 09:30:00 CDT)           Non-HDL Cholesterol: 97 [20 mmol/L - 31 mmol/L] (04/23/18 09:30:00 CDT)           HDL: 37 mg/dL Low [65 mg/dL - 99 mg/dL] (04/23/18 09:30:00 CDT)           Cholesterol/HDL Ratio: 3.6 [0.7 mg/dL - 1.33 mg/dL] (04/23/18 09:30:00 CDT)           LDL: 71 [0.7 mg/dL - 1.33 mg/dL] (04/23/18 09:30:00 CDT)           Triglyceride: 180 mg/dL High [8.6 mg/dL - 10.3 mg/dL] (04/23/18 09:30:00 CDT)           U Microalbumin: 0.6 mg/dL [6  - 22] (04/23/18 09:30:00 CDT)           Microalbumin Comment: See comment [6  - 22] (04/23/18 09:30:00 CDT)          [1] DM; Dany CHAMBERS, Clare 08/23/2017 12:01 CDT

## 2022-02-16 NOTE — NURSING NOTE
[facility]. Pt was in this morning for an appt and we completed paperwork for a colonoscopy. Patient's preference was [facility], however; his BMI is >45, which will mean he needs to go to [facility] (pt not aware of this yet). Clare says that would be a good time also for him to bring up his concerns about his hernia mesh.Pt returned call and informed of above.

## 2022-02-16 NOTE — TELEPHONE ENCOUNTER
---------------------  From: Thi Meng CMA (eRx Pool ([id number]_[facility]))   To: ABHIJEET Message Pool ([id number]);     Sent: 7/31/2019 1:29:56 PM CDT  Subject: FW: Medication Management   Due Date/Time: 8/1/2019 8:09:00 AM CDT           Entered by Thi Meng CMA on July 31, 2019 1:29:47 PM CDT  PCP:   ABHIJEET    Medication:   Glipizide  Last Filled:  1/11/19   Quantity:  180 Refills:  1    Date of last office visit and reason:   1/11/19 DM check  Date of last labs pertaining to condition:  1/4/19    Note:  Please advise on refill, no RTC placed.     Return to Clinic order placed?  no    Resource:   eRx pool  Phone:   [phone]    ** Patient matched by Thi Meng CMA on 7/31/2019 1:26:56 PM CDT **      ------------------------------------------  From: Freddie Dia  To: Clare Garcia  Sent: July 31, 2019 8:09:22 AM CDT  Subject: Medication Management  Due: August 1, 2019 8:09:22 AM CDT    ** On Hold Pending Signature **  Drug: glipiZIDE (glipiZIDE 10 mg oral tablet)  TAKE ONE TABLET BY MOUTH TWICE DAILY  Quantity: 180 unknown unit Days Supply: 0         Refills: 0  Substitutions Allowed  Notes from Pharmacy:     Dispensed Drug: glipiZIDE (glipiZIDE 10 mg oral tablet)  TAKE ONE TABLET BY MOUTH TWICE DAILY  Quantity: 180 unknown unit Days Supply: 0         Refills: 0  Substitutions Allowed  Notes from Pharmacy:   ---------------------------------------------------------------  From: Myra Sigala   To: [facility]    Sent: 7/31/2019 2:29:34 PM CDT  Subject: FW: Medication Management     ** Submitted: **  Order:glipiZIDE (glipiZIDE 10 mg oral tablet)  1 tab(s)  Oral  bid  Qty:  180 unknown unit        Days Supply:  0        Refills:  0          Substitutions Allowed     Route To Pharmacy - [facility]    Signed by Myra Sigala  7/31/2019 2:29:00 PM    ** Submitted: **  Complete:glipiZIDE (glipiZIDE 10 mg oral tablet)   Signed by Myra Sigala  7/31/2019 2:29:00 PM    ** Not Approved:   **  glipiZIDE (GLIPIZIDE    TAB 10MG TABLET)  TAKE ONE TABLET BY MOUTH TWICE DAILY  Qty:  180 unknown unit        Days Supply:  0        Refills:  0          Substitutions Allowed     Route To Pharmacy - [facility]   Signed by Myra Sigala        last visit: 1/11/19 for DM check, placed RTC for DM check in one month- will give one refill per protocol until then.

## 2022-02-16 NOTE — LETTER
(Inserted Image. Unable to display)            July 01, 2021      TAWANA GILES      [address] [place] [id number]        Dear TAWANA,    Thank you for selecting [facility] for your healthcare needs.  Below you will find the results of the recent tests done at our clinic.      The prostate specific antigen test is normal, Steve, that is reassuring. If the urinary symptoms continue to be bothersome, let me know and I will place a referral to urology. Thanks.      Result Name Current Result Reference Range   PSA (ng/mL)  0.2 6/30/2021  - < OR = 4.0       Please contact me or my assistant at [phone] if you have any questions or concerns.     Sincerely,        ANGUS Gardner-NP  Family Nurse Practitioner      What do your labs mean?  Below is a glossary of commonly ordered labs:  LDL   Bad Cholesterol   HDL   Good Cholesterol  AST/ALT   Liver Function   Cr/Creatinine   Kidney Function  Microalbumin   Kidney Function  BUN   Kidney Function  PSA   Prostate    TSH   Thyroid Hormone  HgbA1c   Diabetes Test   Hgb (Hemoglobin)   Red Blood Cells  WBC   White Blood Cell Count

## 2022-02-16 NOTE — LETTER
(Inserted Image. Unable to display)     August 30, 2019      TAWANA GILES  [address] [place], [place] [id number]          Dear TAWANA,      Thank you for selecting [facility] (previously [facility] for your healthcare needs.    Our records indicate you are due for the following services:    Diabetic Exam ~ Please bring your glucose meter and/or your blood glucose diary to your appointment.    Urine Labs ~ Please be prepared to leave a urine specimen for evaluation.  Fasting Lab Tests ~ Please do not eat or drink anything 10 hours prior to your scheduled appointment time.  (Water and any medications that you may need are allowed unless directed otherwise.)    If you had your labs done at another facility or with Direct Access Lab Testing at [facility], please bring in a copy of the results to your next visit, mail a copy, or drop off a copy of your results to your Healthcare Provider.    You are due for lab work and an office visit; please schedule the lab appointment 1 week before the office visit.  This will assure all results are available to discuss with your provider during your visit.    **It is very helpful if you bring your medication bottles to your appointment.  This assures we have all of your current medications, including strength and dosing information, documented accurately in your medical record.    To schedule an appointment or if you have further questions, please contact your primary clinic:   [facility]       [phone]   [facility]       [phone]              [facility]     [phone]      Powered by [employer]    Sincerely,    TING Fontanez

## 2022-02-16 NOTE — PROGRESS NOTES
Patient:   TAWANA GILES            MRN: [id number]            FIN: [id number]               Age:   61 years     Sex:  Male     :  1960   Associated Diagnoses:   Diabetes mellitus, type II; Elevated blood pressure reading; Obesity; Urinary retention   Author:   Clare Garcia      Visit Information      Date of Service: 2021 08:40 am  Performing Location: [facility]  Encounter#: [id number]      Primary Care Provider (PCP):  Clare Garcia    NPI# [id number]      Referring Provider:  Clare Garcia# [id number]      Chief Complaint   2021 8:50 AM CDT    DM visit, all labs UTD        History of Present Illness   here for med check. 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 He is gaining and feels some intermit discomfort where his hernia mesh is. he also has hx of colon polyps and needs colonoscopy again. his BMI makes this a higher risk procedure. He willk consult with [facility]  He denies neuropathy or sores on his feet  Has some trouble fully emptying his bladder at night      Health Status   Allergies:    Allergic Reactions (Selected)  Severity Not Documented  Actos (Rash)  Farxiga (Rash)  Nonallergic Reactions (Selected)  Severity Not Documented  MetFORMIN (Diarrhea)   Medications:  (Selected)   Prescriptions  Prescribed  Lantus Solostar Pen 100 units/mL subcutaneous solution: See Instructions, Instructions: starting at 20u and increasing up to 40u as needed max dose   every evening, # 15 mL, 3 Refill(s), Type: Maintenance, Pharmacy: [facility], starting at 20u and increasing up to 40u as needed max dose ; every evening,...  MetFORMIN (Eqv-Glucophage XR) 500 mg oral tablet, extended release: = 2 tab(s), Oral, bid, # 360 tab(s), 1 Refill(s), Type: Maintenance, Pharmacy: [facility], 2 tab(s) Oral bid, 69, in, 21 8:50:00 CDT, Height Measured,  322.3, lb, 06/30/21 8:50:00 CDT, Weight Measured  NovoLOG FlexPen 100 units/mL injectable solution: See Instructions, Instructions: take insulin 15 minutes before meals   5u am, 7u noon, 7u evening meal increasing as directed, # 15 mL, 3 Refill(s), Type: Maintenance, Pharmacy: [facility], take insulin 15 minutes before meals ; 5u am, 7u noon, 7u e...  TRUETEST BGM STRIPS MG BEAD: See Instructions, Instructions: TEST THREE TIMES A DAY TO FOUR TIMES A DAY, # 50 unknown unit, 5 Refill(s), Type: Soft Stop, Pharmacy: [facility], TEST THREE TIMES A DAY TO FOUR TIMES A DAY  True Test BGM Strips MG BEAD: True Test BGM Strips MG BEAD, See Instructions, Instructions: Test 3-4 times daily, Supply, # 1 box(es), 5 Refill(s), Type: Maintenance, Pharmacy: [facility], Test 3-4 times daily  Ventolin HFA 90 mcg/inh inhalation aerosol: 2 puff(s), Inhale, qid, # 1 EA, 1 Refill(s), Type: Maintenance, Pharmacy: [facility], 2 puff(s) Inhale qid, 69, in, 06/16/20 9:31:00 CDT, Height Measured, 293, lb, 06/16/20 9:31:00 CDT, Weight Measured  glipiZIDE 10 mg oral tablet: See Instructions, Instructions: TAKE ONE TABLET BY MOUTH TWICE DAILY, # 180 EA, 1 Refill(s), Type: Maintenance, Pharmacy: [facility], TAKE ONE TABLET BY MOUTH TWICE DAILY, 69, in, 06/30/21 8:50:00 CDT, Height Measured, 322.3, lb, 06/30/21 8:50:00 CD...  lisinopril 2.5 mg oral tablet: = 1 tab(s) ( 2.5 mg ), Oral, daily, # 90 tab(s), 3 Refill(s), Type: Maintenance, Pharmacy: [facility], 1 tab(s) Oral daily, 69, in, 06/30/21 8:50:00 CDT, Height Measured, 322.3, lb, 06/30/21 8:50:00 CDT, Weight Measured  pen needles 31G 6mm: pen needles 31G 6mm, See Instructions, Instructions: use new needle daily with Soliqua, Supply, # 100 EA, 1 Refill(s), Type: Maintenance, Pharmacy: [facility], use new needle daily with Soliqua, 69, in, 06/30/21 8:50:00 CDT, Height Measured, 322.3,...  simvastatin 40 mg oral tablet: See Instructions, Instructions: TAKE ONE TABLET BY MOUTH AT  BEDTIME, # 90 EA, 3 Refill(s), Type: Maintenance, Pharmacy: [facility], TAKE ONE TABLET BY MOUTH AT BEDTIME, 69, in, 12/23/20 9:29:00 CST, Height Measured, 312.5, lb, 12/23/20 9:29:00 CST,...  Documented Medications  Documented  aspirin 325 mg oral tablet: 1 tab(s) ( 325 mg ), po, daily, 0 Refill(s), Type: Maintenance,    Medications          *denotes recorded medication          TRUETEST BGM STRIPS MG BEAD: See Instructions, TEST THREE TIMES A DAY TO FOUR TIMES A DAY, 50 unknown unit, 5 Refill(s).          pen needles 31G 6mm: See Instructions, use new needle daily with Soliqua, 100 EA, 1 Refill(s).          True Test BGM Strips MG BEAD: See Instructions, Test 3-4 times daily, 1 box(es).          Ventolin HFA 90 mcg/inh inhalation aerosol: 2 puff(s), Inhale, qid, 1 EA, 1 Refill(s).          *aspirin 325 mg oral tablet: 325 mg, 1 tab(s), po, daily, 0 Refill(s).          glipiZIDE 10 mg oral tablet: See Instructions, TAKE ONE TABLET BY MOUTH TWICE DAILY, 180 EA, 1 Refill(s).          NovoLOG FlexPen 100 units/mL injectable solution: See Instructions, take insulin 15 minutes before meals   5u am, 7u noon, 7u evening meal increasing as directed, 15 mL, 3 Refill(s).          Lantus Solostar Pen 100 units/mL subcutaneous solution: See Instructions, starting at 20u and increasing up to 40u as needed max dose   every evening, 15 mL, 3 Refill(s).          lisinopril 2.5 mg oral tablet: 2.5 mg, 1 tab(s), Oral, daily, 90 tab(s), 3 Refill(s).          MetFORMIN (Eqv-Glucophage XR) 500 mg oral tablet, extended release: 2 tab(s), Oral, bid, 360 tab(s), 1 Refill(s).          simvastatin 40 mg oral tablet: See Instructions, TAKE ONE TABLET BY MOUTH AT BEDTIME, 90 EA, 3 Refill(s).       Problem list:    All Problems  Diabetes mellitus, type II / SNOMED CT 204767381 / Confirmed  Long term current use of oral hypoglycemic drug / SNOMED CT 209057278 / Confirmed  Family History of Colon Cancer / ICD-9-CM V16.0 / Confirmed  Obesity /  ICD-9-.00 / Confirmed  Tubular adenoma of colon / SNOMED CT 1528042264 / Confirmed  Canceled: Diabetes mellitus type II.. / ICD-9-.00      Histories   Past Medical History:    Active  Diabetes mellitus, type II (138615347): Onset in the month of 2009 at 49 years  Obesity (278.00)   Family History:    CA - Lung cancer  Mother ()  CA - Cancer  Father ()  Comments:  3/24/2010 11:40 AM CDT - Rossy Bob CMA  kidney ca  CA - Cancer of colon  Father ()  Accident  Sister ()     Procedure history:    Extracapsular cataract extraction and insertion of intraocular lens (SNOMED CT 672036781) performed by Adrián Licona MD on 2021 at 60 Years.  Comments:  2021 9:30 AM NICA - Pratima Oh.  Colonoscopy (SNOMED CT 099383292) performed by Lawrence Loredo MD on 2015 at 55 Years.  Comments:  2015 5:15 PM CDT Lawrence Crowley MD  divertculosis extending to the transverse    2015 5:06 PM EPHRAIMT Lawrence Crowley MD  Indication: Family history  Sedation: MAC  Findings: Tubular adenoma Proximal ascending  Recommendation: Colonoscopy in 5 years  Colonoscopy (SNOMED CT 485412547) in the month of 2009 at 48 Years.  Comments:  2011 6:15 PM Yocasta Martin .  Repair of ventral hernia (SNOMED CT 683226282) in the week of 10/11/2002 at 42 Years.  Repair of ventral hernia (SNOMED CT 285581334) in  at 28 Years.  back surgery in  at 20 Years.  Comments:  3/24/2010 11:36 AM CDT - Rossy Bob  ?'s  Vasectomy (SNOMED CT 68778599).   Social History:        Electronic Cigarette/Vaping Assessment            Electronic Cigarette Use: Never.      Alcohol Assessment: Current                     Comments:                      2011 - Yocasta Carroll                     3 to 4 beers once a week.      Tobacco Assessment            Never (less than 100 in lifetime)      Substance Abuse Assessment: Denies Substance Abuse      Employment and  Education Assessment                     Comments:                      11/08/2011 - Yocasta Carroll                     .      Home and Environment Assessment            Marital status: .                     Comments:                      11/08/2011 - Yocasta Carroll                     Wife - Marie.        Physical Examination   VS/Measurements   General:  Alert and oriented, No acute distress.    Neck:  Supple, Non-tender.    Respiratory:  Lungs are clear to auscultation, Respirations are non-labored.    Gastrointestinal:  round soft with some tenderness superficailly left upper quad.    Musculoskeletal:  Normal gait.    Integumentary:  Warm, Pink.       Impression and Plan   Diagnosis     Diabetes mellitus, type II (GUQ29-QN E11.9).     Elevated blood pressure reading (YTJ68-KQ R03.0).     Obesity (UQE61-HJ E66.01).     Urinary retention (QRS61-IQ R33.9).     Patient Instructions:       Counseled: Patient, Regarding diagnosis, Regarding treatment, Regarding medications, Verbalized understanding, will start on low dose ACE for kidney protection and recheck bp in 3 weeks  DM recheck 6 months.    Orders     Orders (Selected)   Outpatient Orders  Ordered  Return to Clinic (Request): RFV: BP med check (started on Lisinopril), Return in 3 weeks  Ordered (In Transit)  PSA, Total (Room)* ([facility]): Specimen Type: Serum, Collection Date: 06/30/21 9:24:00 CDT  Prescriptions  Prescribed  Lantus Solostar Pen 100 units/mL subcutaneous solution: See Instructions, Instructions: starting at 20u and increasing up to 40u as needed max dose   every evening, # 15 mL, 3 Refill(s), Type: Maintenance, Pharmacy: [facility], starting at 20u and increasing up to 40u as needed max dose ; every evening,...  MetFORMIN (Eqv-Glucophage XR) 500 mg oral tablet, extended release: = 2 tab(s), Oral, bid, # 360 tab(s), 1 Refill(s), Type: Maintenance, Pharmacy: [facility], 2 tab(s) Oral bid, 69, in, 06/30/21 8:50:00 CDT, Height  Measured, 322.3, lb, 06/30/21 8:50:00 CDT, Weight Measured  NovoLOG FlexPen 100 units/mL injectable solution: See Instructions, Instructions: take insulin 15 minutes before meals   5u am, 7u noon, 7u evening meal increasing as directed, # 15 mL, 3 Refill(s), Type: Maintenance, Pharmacy: [facility], take insulin 15 minutes before meals ; 5u am, 7u noon, 7u e...  glipiZIDE 10 mg oral tablet: See Instructions, Instructions: TAKE ONE TABLET BY MOUTH TWICE DAILY, # 180 EA, 1 Refill(s), Type: Maintenance, Pharmacy: [facility], TAKE ONE TABLET BY MOUTH TWICE DAILY, 69, in, 06/30/21 8:50:00 CDT, Height Measured, 322.3, lb, 06/30/21 8:50:00 CD...  lisinopril 2.5 mg oral tablet: = 1 tab(s) ( 2.5 mg ), Oral, daily, # 90 tab(s), 3 Refill(s), Type: Maintenance, Pharmacy: [facility], 1 tab(s) Oral daily, 69, in, 06/30/21 8:50:00 CDT, Height Measured, 322.3, lb, 06/30/21 8:50:00 CDT, Weight Measured  pen needles 31G 6mm: pen needles 31G 6mm, See Instructions, Instructions: use new needle daily with Soliqua, Supply, # 100 EA, 1 Refill(s), Type: Maintenance, Pharmacy: [facility], use new needle daily with Soliqua, 69, in, 06/30/21 8:50:00 CDT, Height Measured, 322.3,...  simvastatin 40 mg oral tablet: See Instructions, Instructions: TAKE ONE TABLET BY MOUTH AT BEDTIME, # 90 EA, 3 Refill(s), Type: Maintenance, Pharmacy: [facility], TAKE ONE TABLET BY MOUTH AT BEDTIME, 69, in, 12/23/20 9:29:00 CST, Height Measured, 312.5, lb, 12/23/20 9:29:00 CST,....

## 2022-02-16 NOTE — NURSING NOTE
Vital Signs Entered On:  6/30/2021 9:34 AM CDT    Performed On:  6/30/2021 9:34 AM CDT by Ingrid Calvin LPN               Vital Signs   Systolic Blood Pressure :   128 mmHg   Diastolic Blood Pressure :   74 mmHg   Mean Arterial Pressure :   92 mmHg   BP Site :   Right arm   Ingrid Calvin LPN - 6/30/2021 9:34 AM CDT

## 2022-02-16 NOTE — TELEPHONE ENCOUNTER
"---------------------  From: Anna Avitia CMA (eRx Pool ([id number]))   To: ABHIJEET Message Pool ([address]);     Sent: 11/1/2019 8:18:35 AM CDT  Subject: FW: Medication Management   Due Date/Time: 11/1/2019 3:45:00 PM CDT     Last seen: 10/18/19; DM  RTC: April   Please see pharmacy note below and advise.     \"Notes from Pharmacy: ARE YOU ABLE TO GIVE SAMPLE OR SEND FOR DIFFERENT INSLUIN? PT DON'T HAVE PHARMACY INS\"            ** Patient matched by Anna Avitia CMA on 11/1/2019 8:16:42 AM CDT **      ------------------------------------------  From: Freddie Dia  To: Clare Garcia  Sent: October 31, 2019 3:45:00 PM CDT  Subject: Medication Management  Due: November 1, 2019 3:45:00 PM CDT    ** On Hold Pending Signature **  Drug: insulin glargine-lixisenatide (Soliqua 100/33 subcutaneous solution)  INJECT 15 UNITS ONCE DAILY INCREASE 1 U ONCE DAILY TIL CLOSE TO GOAL, INCREASE 1 U 4-5 DAYS TILL GONE  Quantity: 15 unknown unit  Days Supply: 0  Refills: 0  Substitutions Allowed  Notes from Pharmacy: ARE YOU ABLE TO GIVE SAMPLE OR SEND FOR DIFFERENT INSLUIN? PT DON'T HAVE PHARMACY INS    Dispensed Drug: insulin glargine-lixisenatide (Soliqua 100/33 subcutaneous solution)  INJECT 15 UNITS ONCE DAILY INCREASE 1 U ONCE DAILY TIL CLOSE TO GOAL, INCREASE 1 U 4-5 DAYS TILL GONE  Quantity: 15 unknown unit  Days Supply: 0  Refills: 0  Substitutions Allowed  Notes from Pharmacy: ARE YOU ABLE TO GIVE SAMPLE OR SEND FOR DIFFERENT INSLUIN? PT DON'T HAVE PHARMACY INS  ---------------------------------------------------------------  From: Myra Sigala ([employer] Message Pool ([id number]))   To: Care Coordinators Pool ([facility]);     Sent: 11/1/2019 10:16:00 AM CDT  Subject: FW: Medication Management   Due Date/Time: 11/1/2019 3:45:00 PM CDTwe have one sample left.Pt was given savings card to use. Called pt to see if he has tried to pick this up yet with savings card- pt did not answer, left " generic message to call back.---------------------  From: Kemi Umaña CMA (Care Coordinators Pool ([facility]))   To: ABHIJEET Message Pool ([id number]);     Sent: 11/5/2019 1:42:26 PM CST  Subject: RE: Medication Management     I believe he was given a discount card that shouldve allowed him to get a free supply x 1 yr.

## 2022-02-16 NOTE — TELEPHONE ENCOUNTER
---------------------From: Shelby Schneider CMA (Phone Messages Pool ([address] [place] [address])) To: [facility] Message Pool ([address] [place] [address]);   Cc: Marcella Moser;    Sent: 12/17/2019 1:18:52 PM CSTSubject: General Message-Soliqua Phone MessagePCP:   [facility]  Time of Call:  1302   Person Calling:  selfPhone number:  [phone] call at: [phone]:   pt calling to let you know he was unable to fill the Soliqua even with the Savings card.  He's wondering if he can just go back on once daily insulin(don't see record in chart).  Please advise Last office visit and reason:  10/18/2019---------------------From: Myra Sigala ([facility] Message Pool ([address] [place] [address])) To: Clare Garcia;   Sent: 12/17/2019 1:41:21 PM CSTSubject: FW: General Message-Soliqua---------------------From: Clare Garcia To: Marcella Moser; [facility] Message Pool ([address] [place] [address]);   Sent: 12/17/2019 1:50:42 PM CSTSubject: FW: General Message-Soliqua I am forwarding to Alexandra Moser RD for advice---------------------From: Marcella Moser To: [facility] Message Pool ([address] [place] [address]); Clare Garcia;   Sent: 12/17/2019 2:21:28 PM CSTSubject: RE: General Message-Soliqua Called and discussed with pt.  [facility]  New card left for pt to  at  and new rx sent in.Noted.---------------------From: Ingrid Calvin LPN ([facility] Message Pool ([address]_[facility])) To: Clare Garcia;   Sent: 12/18/2019 8:27:44 AM CSTSubject: FW: General Message-SolMaria Isabel called and left a message stating he couldn't fill it at [facility].  I returned call but no answer.  Will need to discuss options with pt to decide next steps.

## 2022-03-02 NOTE — TELEPHONE ENCOUNTER
Entered by Vannesa Mcgowan MA on February 24, 2022 9:42:51 AM CST  ---------------------  From: Vannesa Mcgowan MA   To: [facility]    Sent: 2/24/2022 9:42:51 AM CST  Subject: Medication Management     ** Submitted: **  Order:glipiZIDE (glipiZIDE 10 mg oral tablet)  1 tab(s)  Oral  bid  Qty:  60 tab(s)        Days Supply:  30        Refills:  0          Substitutions Allowed     Route To Pharmacy - [facility]    Signed by Vannesa Mcgowan MA  2/24/2022 3:42:00 PM [facility]    ** Submitted: **  Complete:glipiZIDE (glipiZIDE 10 mg oral tablet)   Signed by Vannesa Mcgowan MA  2/24/2022 3:42:00 PM [facility]    ** Not Approved:  **  glipiZIDE (GLIPIZIDE 10MG TABLET)  TAKE 1 TABLET BY MOUTH TWICE DAILY  Qty:  60 tab(s)        Days Supply:  30        Refills:  5          Substitutions Allowed     Route To Pharmacy - [facility]   Signed by Vannesa Mcgowan MA            ------------------------------------------  From: [employer]  To: Clare Garcia  Sent: February 22, 2022 10:36:06 AM CST  Subject: Medication Management  Due: February 19, 2022 12:49:38 PM CST     ** On Hold Pending Signature **     Drug: glipiZIDE (glipiZIDE 10 mg oral tablet), TAKE 1 TABLET BY MOUTH TWICE DAILY  Quantity: 60 tab(s)  Days Supply: 30  Refills: 5  Substitutions Allowed  Notes from Pharmacy:     Dispensed Drug: glipiZIDE (glipiZIDE 10 mg oral tablet), TAKE 1 TABLET BY MOUTH TWICE DAILY  Quantity: 60 tab(s)  Days Supply: 30  Refills: 5  Substitutions Allowed  Notes from Pharmacy:  ------------------------------------------

## 2022-03-02 NOTE — TELEPHONE ENCOUNTER
Entered by Vannesa Mcgowan MA on February 24, 2022 9:53:03 AM CST  ---------------------  From: Vannesa Mcgowan MA   To: [facility]    Sent: 2/24/2022 9:53:03 AM CST  Subject: Medication Management     ** Not Approved: Refill not appropriate, #60 already sent in 02/24/2022 **  glipiZIDE (GLIPIZIDE 10MG TABLET)  TAKE 1 TABLET BY MOUTH TWICE DAILY  Qty:  60 tab(s)        Days Supply:  30        Refills:  0          Substitutions Allowed     Route To Pharmacy - [facility]   Signed by Vannesa Mcgowan MA            ------------------------------------------  From: [employer]  To: Clare Garcia  Sent: February 24, 2022 9:46:28 AM CST  Subject: Medication Management  Due: February 23, 2022 1:15:59 PM CST     ** On Hold Pending Signature **     Drug: glipiZIDE (glipiZIDE 10 mg oral tablet), TAKE 1 TABLET BY MOUTH TWICE DAILY  Quantity: 60 tab(s)  Days Supply: 30  Refills: 0  Substitutions Allowed  Notes from Pharmacy:     Dispensed Drug: glipiZIDE (glipiZIDE 10 mg oral tablet), TAKE 1 TABLET BY MOUTH TWICE DAILY  Quantity: 60 tab(s)  Days Supply: 30  Refills: 0  Substitutions Allowed  Notes from Pharmacy:  ------------------------------------------

## 2022-03-02 NOTE — TELEPHONE ENCOUNTER
Entered by Mark Zayas CMA on January 27, 2022 2:48:41 PM CST  ---------------------  From: Mark Zayas CMA   To: [facility]    Sent: 1/27/2022 2:48:41 PM CST  Subject: Medication Management     ** Submitted: **  Order:glipiZIDE (glipiZIDE 10 mg oral tablet)  1 tab(s)  Oral  bid  Qty:  60 tab(s)        Refills:  0          Substitutions Allowed     Route To Pharmacy - [facility]    Signed by Mark Zayas CMA  1/27/2022 8:48:00 PM [facility]    ** Submitted: **  Complete:glipiZIDE (glipiZIDE 10 mg oral tablet)   Signed by Mark Zayas CMA  1/27/2022 8:48:00 PM [facility]    ** Not Approved:  **  glipiZIDE (GLIPIZIDE 10MG TABLET)  TAKE ONE TABLET BY MOUTH TWICE DAILY  Qty:  180 tab(s)        Days Supply:  90        Refills:  1          Substitutions Allowed     Route To Pharmacy - [facility]   Signed by Mark Zayas CMA            ------------------------------------------  From: [facility]  To: Clare Garcia  Sent: January 26, 2022 11:39:07 AM CST  Subject: Medication Management  Due: January 21, 2022 11:04:18 AM CST     ** On Hold Pending Signature **     Drug: glipiZIDE (glipiZIDE 10 mg oral tablet), TAKE ONE TABLET BY MOUTH TWICE DAILY  Quantity: 180 tab(s)  Days Supply: 90  Refills: 1  Substitutions Allowed  Notes from Pharmacy:     Dispensed Drug: glipiZIDE (glipiZIDE 10 mg oral tablet), TAKE ONE TABLET BY MOUTH TWICE DAILY  Quantity: 180 tab(s)  Days Supply: 90  Refills: 1  Substitutions Allowed  Notes from Pharmacy:  ------------------------------------------Med Refill      Date of last office visit and reason:  6/30/21 DM      Date of last Med Check / Px:     Date of last labs pertaining to med:  6/30/21    Note:  Rx filled per protocol.  Mark Zayas CMA    RTC order in chart:  yes    For Protocol refill, has patient been contacted:  _

## 2022-03-14 DIAGNOSIS — E11.9 TYPE 2 DIABETES MELLITUS WITHOUT COMPLICATION, UNSPECIFIED WHETHER LONG TERM INSULIN USE (H): Primary | ICD-10-CM

## 2022-03-16 RX ORDER — SIMVASTATIN 40 MG
TABLET ORAL
Qty: 90 TABLET | Refills: 3 | Status: SHIPPED | OUTPATIENT
Start: 2022-03-16

## 2022-03-16 NOTE — TELEPHONE ENCOUNTER
Routing refill request to provider for review/approval because:  A break in medication. Last filled 12/23/2020 for one year.   10/12/2020 last lipid panel    Last Written Prescription Date:  12/23/20  Last Fill Quantity: 90,  # refills: 3   Last office visit: Visit date not found with prescribing provider: 6/30/21 med check [facility]

## 2022-03-17 RX ORDER — GLIPIZIDE 10 MG/1
TABLET ORAL
Qty: 60 TABLET | Refills: 0 | OUTPATIENT
Start: 2022-03-17

## 2022-03-17 NOTE — TELEPHONE ENCOUNTER
Refill request for glipiZIDE (GLUCOTROL) 10 MG tablet     Routing refill request to provider for review/approval because:  Patient needs to be seen because:  Patient is due for an 6 month Chronic Disease management visit and labs.     Last Written Prescription Date:  02/24/22  Last Fill Quantity: 60,  # refills: 0   Last office visit: 6/30/21  Future Office Visit:  None    6/1/2021 Alc completed.  10/12/2020 BMP; Lipids; U Creatinine completed.    Obinna Pinzon RN  [facility]